# Patient Record
Sex: FEMALE | Race: BLACK OR AFRICAN AMERICAN | NOT HISPANIC OR LATINO | Employment: OTHER | URBAN - METROPOLITAN AREA
[De-identification: names, ages, dates, MRNs, and addresses within clinical notes are randomized per-mention and may not be internally consistent; named-entity substitution may affect disease eponyms.]

---

## 2017-01-20 ENCOUNTER — TRANSCRIBE ORDERS (OUTPATIENT)
Dept: ADMINISTRATIVE | Facility: HOSPITAL | Age: 57
End: 2017-01-20

## 2017-01-20 DIAGNOSIS — Z12.31 VISIT FOR SCREENING MAMMOGRAM: Primary | ICD-10-CM

## 2017-02-27 ENCOUNTER — HOSPITAL ENCOUNTER (OUTPATIENT)
Dept: MAMMOGRAPHY | Facility: HOSPITAL | Age: 57
Discharge: HOME | End: 2017-02-27
Admitting: FAMILY MEDICINE

## 2017-02-27 DIAGNOSIS — Z12.31 VISIT FOR SCREENING MAMMOGRAM: ICD-10-CM

## 2017-02-27 PROCEDURE — 77063 BREAST TOMOSYNTHESIS BI: CPT | Performed by: RADIOLOGY

## 2017-02-27 PROCEDURE — G0202 SCR MAMMO BI INCL CAD: HCPCS

## 2017-02-27 PROCEDURE — 77067 SCR MAMMO BI INCL CAD: CPT | Performed by: RADIOLOGY

## 2017-02-27 PROCEDURE — 77063 BREAST TOMOSYNTHESIS BI: CPT

## 2018-02-13 ENCOUNTER — TRANSCRIBE ORDERS (OUTPATIENT)
Dept: ADMINISTRATIVE | Facility: HOSPITAL | Age: 58
End: 2018-02-13

## 2018-02-13 DIAGNOSIS — Z12.31 VISIT FOR SCREENING MAMMOGRAM: Primary | ICD-10-CM

## 2018-03-01 ENCOUNTER — HOSPITAL ENCOUNTER (OUTPATIENT)
Dept: MAMMOGRAPHY | Facility: HOSPITAL | Age: 58
Discharge: HOME | End: 2018-03-01
Admitting: FAMILY MEDICINE

## 2018-03-01 DIAGNOSIS — Z12.31 VISIT FOR SCREENING MAMMOGRAM: ICD-10-CM

## 2018-03-01 PROCEDURE — 77063 BREAST TOMOSYNTHESIS BI: CPT

## 2018-03-01 PROCEDURE — 77067 SCR MAMMO BI INCL CAD: CPT | Performed by: RADIOLOGY

## 2018-03-01 PROCEDURE — 77063 BREAST TOMOSYNTHESIS BI: CPT | Performed by: RADIOLOGY

## 2018-03-01 PROCEDURE — 77067 SCR MAMMO BI INCL CAD: CPT

## 2019-01-23 ENCOUNTER — TRANSCRIBE ORDERS (OUTPATIENT)
Dept: ADMINISTRATIVE | Facility: HOSPITAL | Age: 59
End: 2019-01-23

## 2019-01-23 DIAGNOSIS — Z12.31 VISIT FOR SCREENING MAMMOGRAM: Primary | ICD-10-CM

## 2019-03-04 ENCOUNTER — HOSPITAL ENCOUNTER (OUTPATIENT)
Dept: MAMMOGRAPHY | Facility: HOSPITAL | Age: 59
Discharge: HOME | End: 2019-03-04
Admitting: FAMILY MEDICINE

## 2019-03-04 DIAGNOSIS — Z12.31 VISIT FOR SCREENING MAMMOGRAM: ICD-10-CM

## 2019-03-04 PROCEDURE — 77063 BREAST TOMOSYNTHESIS BI: CPT | Performed by: RADIOLOGY

## 2019-03-04 PROCEDURE — 77063 BREAST TOMOSYNTHESIS BI: CPT

## 2019-03-04 PROCEDURE — 77067 SCR MAMMO BI INCL CAD: CPT | Performed by: RADIOLOGY

## 2019-03-04 PROCEDURE — 77067 SCR MAMMO BI INCL CAD: CPT

## 2020-02-13 ENCOUNTER — TRANSCRIBE ORDERS (OUTPATIENT)
Dept: ADMINISTRATIVE | Facility: HOSPITAL | Age: 60
End: 2020-02-13

## 2020-02-13 DIAGNOSIS — Z12.31 VISIT FOR SCREENING MAMMOGRAM: Primary | ICD-10-CM

## 2020-04-09 ENCOUNTER — APPOINTMENT (OUTPATIENT)
Dept: MAMMOGRAPHY | Facility: HOSPITAL | Age: 60
End: 2020-04-09

## 2020-05-28 ENCOUNTER — HOSPITAL ENCOUNTER (OUTPATIENT)
Dept: MAMMOGRAPHY | Facility: HOSPITAL | Age: 60
Discharge: HOME | End: 2020-05-28
Admitting: FAMILY MEDICINE

## 2020-05-28 DIAGNOSIS — Z12.31 VISIT FOR SCREENING MAMMOGRAM: ICD-10-CM

## 2020-05-28 PROCEDURE — 77067 SCR MAMMO BI INCL CAD: CPT

## 2020-05-28 PROCEDURE — 77067 SCR MAMMO BI INCL CAD: CPT | Performed by: RADIOLOGY

## 2020-05-28 PROCEDURE — 77063 BREAST TOMOSYNTHESIS BI: CPT

## 2020-05-28 PROCEDURE — 77063 BREAST TOMOSYNTHESIS BI: CPT | Performed by: RADIOLOGY

## 2020-08-11 ENCOUNTER — LAB REQUISITION (OUTPATIENT)
Dept: LAB | Facility: HOSPITAL | Age: 60
End: 2020-08-11

## 2020-08-11 DIAGNOSIS — Z00.00 ROUTINE GENERAL MEDICAL EXAMINATION AT A HEALTH CARE FACILITY: ICD-10-CM

## 2020-08-11 PROCEDURE — 36415 COLL VENOUS BLD VENIPUNCTURE: CPT | Performed by: INTERNAL MEDICINE

## 2020-09-03 ENCOUNTER — OFFICE VISIT (OUTPATIENT)
Dept: OBSTETRICS AND GYNECOLOGY | Facility: CLINIC | Age: 60
End: 2020-09-03

## 2020-09-03 VITALS
BODY MASS INDEX: 25.88 KG/M2 | WEIGHT: 161 LBS | SYSTOLIC BLOOD PRESSURE: 130 MMHG | HEIGHT: 66 IN | DIASTOLIC BLOOD PRESSURE: 70 MMHG

## 2020-09-03 DIAGNOSIS — Z13.820 OSTEOPOROSIS SCREENING: ICD-10-CM

## 2020-09-03 DIAGNOSIS — A63.0 CONDYLOMA ACUMINATUM IN FEMALE: ICD-10-CM

## 2020-09-03 DIAGNOSIS — Z01.419 WOMEN'S ANNUAL ROUTINE GYNECOLOGICAL EXAMINATION: Primary | ICD-10-CM

## 2020-09-03 PROCEDURE — 99396 PREV VISIT EST AGE 40-64: CPT | Performed by: OBSTETRICS & GYNECOLOGY

## 2020-09-03 PROCEDURE — 56501 DESTROY VULVA LESIONS SIM: CPT | Performed by: OBSTETRICS & GYNECOLOGY

## 2020-09-03 NOTE — PATIENT INSTRUCTIONS
Vitamin D Deficiency  Vitamin D deficiency is when your body does not have enough vitamin D. Vitamin D is important to your body because:  · It helps your body use other minerals.  · It helps to keep your bones strong and healthy.  · It may help to prevent some diseases.  · It helps your heart and other muscles work well.  Not getting enough vitamin D can make your bones soft. It can also cause other health problems.  What are the causes?  This condition may be caused by:  · Not eating enough foods that contain vitamin D.  · Not getting enough sun.  · Having diseases that make it hard for your body to absorb vitamin D.  · Having a surgery in which a part of the stomach or a part of the small intestine is removed.  · Having kidney disease or liver disease.  What increases the risk?  You are more likely to get this condition if:  · You are older.  · You do not spend much time outdoors.  · You live in a nursing home.  · You have had broken bones.  · You have weak or thin bones (osteoporosis).  · You have a disease or condition that changes how your body absorbs vitamin D.  · You have dark skin.  · You take certain medicines.  · You are overweight or obese.  What are the signs or symptoms?  · In mild cases, there may not be any symptoms. If the condition is very bad, symptoms may include:  ? Bone pain.  ? Muscle pain.  ? Falling often.  ? Broken bones caused by a minor injury.  How is this treated?  Treatment may include taking supplements as told by your doctor. Your doctor will tell you what dose is best for you. Supplements may include:  · Vitamin D.  · Calcium.  Follow these instructions at home:  Eating and drinking    · Eat foods that contain vitamin D, such as:  ? Dairy products, cereals, or juices with added vitamin D. Check the label.  ? Fish, such as salmon or trout.  ? Eggs.  ? Oysters.  ? Mushrooms.  The items listed above may not be a complete list of what you can eat and drink. Contact a dietitian for more  options.  General instructions  · Take medicines and supplements only as told by your doctor.  · Get regular, safe exposure to natural sunlight.  · Do not use a tanning bed.  · Maintain a healthy weight. Lose weight if needed.  · Keep all follow-up visits as told by your doctor. This is important.  How is this prevented?  · You can get vitamin D by:  ? Eating foods that naturally contain vitamin D.  ? Eating or drinking products that have vitamin D added to them, such as cereals, juices, and milk.  ? Taking vitamin D or a multivitamin that contains vitamin D.  ? Being in the sun. Your body makes vitamin D when your skin is exposed to sunlight. Your body changes the sunlight into a form of the vitamin that it can use.  Contact a doctor if:  · Your symptoms do not go away.  · You feel sick to your stomach (nauseous).  · You throw up (vomit).  · You poop less often than normal, or you have trouble pooping (constipation).  Summary  · Vitamin D deficiency is when your body does not have enough vitamin D.  · Vitamin D helps to keep your bones strong and healthy.  · This condition is often treated by taking a supplement.  · Your doctor will tell you what dose is best for you.  This information is not intended to replace advice given to you by your health care provider. Make sure you discuss any questions you have with your health care provider.  Document Released: 12/06/2012 Document Revised: 08/26/2019 Document Reviewed: 08/26/2019  [employer] Patient Education © 2020 [employer]  Breast Self-Awareness  Breast self-awareness is knowing how your breasts look and feel. Doing breast self-awareness is important. It allows you to catch a breast problem early while it is still small and can be treated. All women should do breast self-awareness, including women who have had breast implants. Tell your doctor if you notice a change in your breasts.  What you need:  · A mirror.  · A well-lit room.  How to do a breast self-exam  A  breast self-exam is one way to learn what is normal for your breasts and to check for changes. To do a breast self-exam:  Look for changes    1. Take off all the clothes above your waist.  2.  front of a mirror in a room with good lighting.  3. Put your hands on your hips.  4. Push your hands down.  5. Look at your breasts and nipples in the mirror to see if one breast or nipple looks different from the other. Check to see if:  ? The shape of one breast is different.  ? The size of one breast is different.  ? There are wrinkles, dips, and bumps in one breast and not the other.  6. Look at each breast for changes in the skin, such as:  ? Redness.  ? Scaly areas.  7. Look for changes in your nipples, such as:  ? Liquid around the nipples.  ? Bleeding.  ? Dimpling.  ? Redness.  ? A change in where the nipples are.  Feel for changes    1. Lie on your back on the floor.  2. Feel each breast. To do this, follow these steps:  ? Pick a breast to feel.  ? Put the arm closest to that breast above your head.  ? Use your other arm to feel the nipple area of your breast. Feel the area with the pads of your three middle fingers by making small circles with your fingers. For the first [place], press lightly. For the second [place], press harder. For the third [place], press even harder.  ? Keep making circles with your fingers at the different pressures as you move down your breast. Stop when you feel your ribs.  ? Move your fingers a little toward the center of your body.  ? Start making circles with your fingers again, this time going up until you reach your collarbone.  ? Keep making up-and-down circles until you reach your armpit. Remember to keep using the three pressures.  ? Feel the other breast in the same way.  3. Sit or  the tub or shower.  4. With soapy water on your skin, feel each breast the same way you did in step 2 when you were lying on the floor.  Write down what you find  Writing down what you find  can help you remember what to tell your doctor. Write down:  · What is normal for each breast.  · Any changes you find in each breast, including:  ? The kind of changes you find.  ? Whether you have pain.  ? Size and location of any lumps.  · When you last had your menstrual period.  General tips  · Check your breasts every month.  · If you are breastfeeding, the best time to check your breasts is after you feed your baby or after you use a breast pump.  · If you get menstrual periods, the best time to check your breasts is 5-7 days after your menstrual period is over.  · With time, you will become comfortable with the self-exam, and you will begin to know if there are changes in your breasts.  Contact a doctor if you:  · See a change in the shape or size of your breasts or nipples.  · See a change in the skin of your breast or nipples, such as red or scaly skin.  · Have fluid coming from your nipples that is not normal.  · Find a lump or thick area that was not there before.  · Have pain in your breasts.  · Have any concerns about your breast health.  Summary  · Breast self-awareness includes looking for changes in your breasts, as well as feeling for changes within your breasts.  · Breast self-awareness should be done in front of a mirror in a well-lit room.  · You should check your breasts every month. If you get menstrual periods, the best time to check your breasts is 5-7 days after your menstrual period is over.  · Let your doctor know of any changes you see in your breasts, including changes in size, changes on the skin, pain or tenderness, or fluid from your nipples that is not normal.  This information is not intended to replace advice given to you by your health care provider. Make sure you discuss any questions you have with your health care provider.  Document Released: 06/05/2009 Document Revised: 08/06/2019 Document Reviewed: 08/06/2019  [employer] Patient Education © 2020 [employer]

## 2020-09-03 NOTE — PROGRESS NOTES
GYN Annual Exam     CC - Here for annual exam.     Subjective   HPI  Jessika Mustafa is a 60 y.o. female, No obstetric history on file., who presents for annual well woman exam.  She is postmenopausal.  Patient reports problems with: none.  Partner Status: Marital Status: single.  New Partners since last visit: no.      Additional OB/GYN History   Current contraception: contraceptive methods: Tubal ligation  Desires to: no contraception  Last Pap : 2019 - results were Negative, HPV non 16/18 positive  Last Completed Pap Smear       Status Date      PAP SMEAR No completions recorded        History of abnormal Pap smear: yes - HPV non 16/18 positive  Family history of uterine, colon, breast, or ovarian cancer: no  Performs monthly Self-Breast Exam: no  Last mammogram:   Last Completed Mammogram       Status Date      MAMMOGRAM Done 2020 MAMMO SCREENING DIGITAL TOMOSYNTHESIS BILATERAL W CAD     Patient has more history with this topic...        Last colonoscopy:  at [facility] pt reports that the results showed 2 polyps, which were removed and sent for testing.  They were benign.  She is due for a follow-up in five years.  Last Completed Colonoscopy       Status Date      COLONOSCOPY No completions recorded        Last DEXA:  - pt reports that osteopenia in spine and hips  Exercises Regularly: no  Feelings of Anxiety or Depression: no    Tobacco Usage?: No   OB History             Para        Term   0            AB        Living           SAB        TAB        Ectopic        Molar        Multiple        Live Births                    Health Maintenance   Topic Date Due   • ANNUAL PHYSICAL  1963   • PNEUMOCOCCAL VACCINE (19-64 MEDIUM RISK) (1 of 1 - PPSV23) 1981   • ZOSTER VACCINE (1 of 2) 2012   • INFLUENZA VACCINE  2020   • HEPATITIS C SCREENING  2020   • PAP SMEAR  2020   • COLONOSCOPY  2020   • Annual Gynecologic Pelvic and Breast Exam   "09/04/2021   • MAMMOGRAM  05/28/2022   • TDAP/TD VACCINES (2 - Td) 05/25/2025       The additional following portions of the patient's history were reviewed and updated as appropriate: allergies, current medications, past family history, past medical history, past social history, past surgical history and problem list.    Review of Systems   Constitutional: Negative.    HENT: Negative.    Eyes: Negative.    Respiratory: Negative.    Cardiovascular: Negative.    Gastrointestinal: Negative.    Genitourinary: Negative.    Skin: Negative.    Psychiatric/Behavioral: Negative.        I have reviewed and agree with the HPI, ROS, and historical information as entered above. Brook Braga MD    Objective   /70   Ht 166.4 cm (65.5\")   Wt 73 kg (161 lb)   LMP 12/01/2010   BMI 26.38 kg/m²     Physical Exam   Constitutional: She is oriented to person, place, and time. She appears well-developed and well-nourished.   HENT:   Head: Normocephalic and atraumatic.   Cardiovascular: Normal rate and regular rhythm.   No murmur heard.  Pulmonary/Chest: Effort normal and breath sounds normal. She has no wheezes. She has no rhonchi. She has no rales. She exhibits no mass, no tenderness and no retraction. Right breast exhibits no mass, no nipple discharge, no skin change and no tenderness. Left breast exhibits no mass, no nipple discharge, no skin change and no tenderness.   Abdominal: Soft. Bowel sounds are normal. She exhibits no mass. There is no tenderness. There is no rigidity and no guarding. No hernia. Hernia confirmed negative in the right inguinal area and confirmed negative in the left inguinal area.   Genitourinary: Vagina normal, uterus normal and cervix normal. Rectal exam shows no external hemorrhoid.       There is lesion on the right labia. There is no rash or tenderness on the right labia. There is no rash, tenderness or lesion on the left labia. Right adnexum displays no mass and no tenderness. Left adnexum " displays no mass and no tenderness. Vagina exhibits no lesion. No vaginal discharge found.   Genitourinary Comments: Chaperone Present   Lymphadenopathy:        Right: No inguinal adenopathy present.        Left: No inguinal adenopathy present.   Neurological: She is alert and oriented to person, place, and time.   Psychiatric: She has a normal mood and affect. Her behavior is normal.   Nursing note and vitals reviewed.      Procedure: Condyloma Treatment    Procedures    Risks and benefits discussed?: YES  All questions answered?: YES  Consents given by: The Patient  Written consent obtained?: Verbal consent was obtained    ANESTHESIA:  None    Indications:   Jessika Mustafa is a 60 y.o. female, No obstetric history on file., who presents today with lesions located on right labia.  They have been present for 1 year. The patient understands all risks, benefits, indications, potential complications, and alternatives, and freely consents for the procedure.  The patient also understands the option of not performing the surgery, the risk for scarring, and the technique of the procedure.    Skin: 4 warts noted on right vulva. Size range is 1-4 mm.    Procedure documentation:  After informed consent was obtained,  the patient was placed in the dorsal lithotomy position and examined.  TCA was carefully applied to the condyloma, taking care to avoid surrounding skin.  This was done till the condyloma whitened thoroughly.  Antibiotic ointment was applied to surrounding skin as a protectant.  Jessika tolerated the procedure well and without complications.  The patient will be alert for any signs of irritation and will follow up as instructed.  She tolerated the procedure well.  There were no complications.    Discussed follow up care and planned to return to clinic as directed.    Brook Braga MD  09/03/2020        Assessment/Plan     Assessment     Problem List Items Addressed This Visit     None      Visit Diagnoses      Women's annual routine gynecological examination    -  Primary    Relevant Orders    Pap IG, HPV-hr    Mammo Screening Digital Tomosynthesis Bilateral With CAD    Osteoporosis screening        Relevant Orders    DEXA Bone Density Axial    Condyloma acuminatum in female              1. GYN annual well woman exam.   2. Condyloma Accuminatum  3. Screening for Osteoporsis  4. HPV +      Plan     1. Reviewed monthly self breast exams.  Instructed to call with lumps, pain, or breast discharge.  Yearly mammograms ordered.  2. Ordered mammogram today.  3. Recommended use of Vitamin D and getting adequate calcium in her diet. (1500mg)  4. Osteoporosis screening ordered today.  5. RTC in 1 year or PRN with problems.  6. Call or RTC if any further issues with Condyloma   7. Normal pap with HPV + in 2019---Repeat pap with HPV testing today    Brook Braga MD  09/03/2020

## 2020-09-11 ENCOUNTER — TELEPHONE (OUTPATIENT)
Dept: OBSTETRICS AND GYNECOLOGY | Facility: CLINIC | Age: 60
End: 2020-09-11

## 2020-09-11 DIAGNOSIS — Z13.820 OSTEOPOROSIS SCREENING: Primary | ICD-10-CM

## 2020-09-11 NOTE — TELEPHONE ENCOUNTER
Spoke w/pt about her bone density scan results and recommendations from provider.  Pt states she was not sure if she had her Vitamin D level checked lately.  I will add an order for those labs.  She is going to stop by and get them drawn.

## 2020-09-11 NOTE — TELEPHONE ENCOUNTER
----- Message from Brook Braga MD sent at 9/3/2020  5:20 PM EDT -----  Can you please call her and let her know her BMD has worsened.   She now has moderate osteopenia.  Has she had vit D level checked?  We should probably check labs if she hasnt had those checked elsewhere.  Also make sure she is taking calcium, vit d and doing weight bearing exercises like walking, lifting weights.  She definitely needs to repeat this in 2 years.  IF she isnt doing any of those things we could start with this.  If she is doing all these things, we may need to consider starting meds.

## 2020-09-15 ENCOUNTER — LAB (OUTPATIENT)
Dept: OBSTETRICS AND GYNECOLOGY | Facility: CLINIC | Age: 60
End: 2020-09-15

## 2020-09-16 ENCOUNTER — RESULTS ENCOUNTER (OUTPATIENT)
Dept: OBSTETRICS AND GYNECOLOGY | Facility: CLINIC | Age: 60
End: 2020-09-16

## 2020-09-16 DIAGNOSIS — Z13.820 OSTEOPOROSIS SCREENING: ICD-10-CM

## 2020-09-16 LAB — 25(OH)D3+25(OH)D2 SERPL-MCNC: 35.8 NG/ML (ref 30–100)

## 2020-09-29 ENCOUNTER — TELEPHONE (OUTPATIENT)
Dept: OBSTETRICS AND GYNECOLOGY | Facility: CLINIC | Age: 60
End: 2020-09-29

## 2020-09-29 NOTE — TELEPHONE ENCOUNTER
Pap smear collected on 9/3/2020 resulted negative, HPV non16/18+. Per Dr. Braga, a colposcopy is warranted. Called patient and informed her of results/recommendations. Patient scheduled for colposcopy on 10/15/2020 at 0840.

## 2020-09-30 DIAGNOSIS — Z12.31 BREAST CANCER SCREENING BY MAMMOGRAM: Primary | ICD-10-CM

## 2020-10-15 ENCOUNTER — OFFICE VISIT (OUTPATIENT)
Dept: OBSTETRICS AND GYNECOLOGY | Facility: CLINIC | Age: 60
End: 2020-10-15

## 2020-10-15 VITALS
HEIGHT: 66 IN | WEIGHT: 161 LBS | DIASTOLIC BLOOD PRESSURE: 78 MMHG | BODY MASS INDEX: 25.88 KG/M2 | SYSTOLIC BLOOD PRESSURE: 158 MMHG

## 2020-10-15 DIAGNOSIS — R87.810 PAP SMEAR OF CERVIX SHOWS HIGH RISK HPV PRESENT: Primary | ICD-10-CM

## 2020-10-15 DIAGNOSIS — Z72.0 TOBACCO ABUSE: ICD-10-CM

## 2020-10-15 PROCEDURE — 57454 BX/CURETT OF CERVIX W/SCOPE: CPT | Performed by: OBSTETRICS & GYNECOLOGY

## 2020-10-15 RX ORDER — FOLIC ACID 1 MG/1
TABLET ORAL
COMMUNITY
Start: 2020-09-04 | End: 2022-09-15

## 2020-10-15 RX ORDER — CITALOPRAM 20 MG/1
TABLET ORAL
COMMUNITY
Start: 2020-09-21

## 2020-10-15 RX ORDER — METHOTREXATE 15 MG/.3ML
INJECTION, SOLUTION SUBCUTANEOUS
COMMUNITY
Start: 2020-10-06 | End: 2022-09-15

## 2020-10-15 RX ORDER — SUMATRIPTAN 100 MG/1
TABLET, FILM COATED ORAL
COMMUNITY
Start: 2020-08-18

## 2020-10-15 RX ORDER — OLMESARTAN MEDOXOMIL AND HYDROCHLOROTHIAZIDE 40/12.5 40; 12.5 MG/1; MG/1
TABLET ORAL
COMMUNITY
Start: 2020-09-08

## 2020-10-15 RX ORDER — ABATACEPT 125 MG/ML
INJECTION, SOLUTION SUBCUTANEOUS
COMMUNITY
Start: 2020-10-06 | End: 2022-09-15

## 2020-10-15 RX ORDER — TRIAMCINOLONE ACETONIDE 1 MG/G
CREAM TOPICAL
COMMUNITY
Start: 2020-10-13 | End: 2022-09-15

## 2020-10-15 RX ORDER — VARENICLINE TARTRATE 0.5 MG/1
0.5 TABLET, FILM COATED ORAL 2 TIMES DAILY
Qty: 30 TABLET | Refills: 2 | Status: SHIPPED | OUTPATIENT
Start: 2020-10-15 | End: 2022-09-15

## 2020-10-15 RX ORDER — POTASSIUM CHLORIDE 1500 MG/1
TABLET, EXTENDED RELEASE ORAL
COMMUNITY
Start: 2020-10-12

## 2020-10-15 RX ORDER — LATANOPROST 50 UG/ML
SOLUTION/ DROPS OPHTHALMIC
COMMUNITY
Start: 2020-09-22

## 2020-10-15 NOTE — PROGRESS NOTES
Colposcopy Procedure Note  Procedures    Indications: Jessika Mustafa is a 60 y.o. female, , who presents today for colposcopy for evaluation of a pap smear revealing: negative non 16/18+.  She understands the need for the procedure and is aware of the complications, including post-colposcopic vaginal bleeding, vaginal leukorrhea or cervicitis.  She is aware she may experience discomfort.  After being presented with the risk, benefits, and alternatives the patient wished to proceed.      Most recent Pap smear showed: negative, non16/18+.      Prior cervical/vaginal disease: HPV related changes.    Prior cervical treatment: no treatment.    Procedure Details   The risks and benefits of the procedure and written informed consent obtained.  She was positioned in the dorsal lithotomy position and a speculum was inserted into the vagina and excellent visualization of cervix achieved, cervix swabbed x 3 with acetic acid solution and Lugol's solution. The transformation zone was not completely visualized.  A cervical biopsy was obtained.  An endocervical curettage was performed.  This colposcopy was unsatisfactory.     Pt with extremely small external os.  This is so small I am unable to get a Kevorkian in to obtain an adequate biopsy.  Biopsy of the 3 o'clock position was taken with a combination of ECC and attempts with the Kevorkian.    Findings:  The procedure was notable for:  Cervix: acetowhite lesion(s) noted at 3 o'clock; cervix swabbed with Lugol's solution and SCJ visualized - lesion at 3 o'clock.  Vaginal inspection: normal without visible lesions.    Physical Exam  Vitals signs and nursing note reviewed. Exam conducted with a chaperone present.   Constitutional:       Appearance: Normal appearance. She is well-developed.   HENT:      Head: Normocephalic and atraumatic.   Pulmonary:      Effort: Pulmonary effort is normal.   Abdominal:      Palpations: Abdomen is not rigid.   Genitourinary:     Exam  position: Lithotomy position.      Comments: Cervix: visible lesion(s) at 3 o'clock  Skin:     General: Skin is warm and dry.   Neurological:      Mental Status: She is alert and oriented to person, place, and time.   Psychiatric:         Mood and Affect: Mood normal.         Behavior: Behavior normal.         Specimens: 3:00 and ECC    Complications: none.    Patient tolerated the procedure well and with minimal discomfort.     Problems Addressed this Visit     None      Visit Diagnoses     Pap smear of cervix shows high risk HPV present    -  Primary    Relevant Orders    Colposcopy    Tobacco abuse          Diagnoses       Codes Comments    Pap smear of cervix shows high risk HPV present    -  Primary ICD-10-CM: R87.810  ICD-9-CM: 795.05     Tobacco abuse     ICD-10-CM: Z72.0  ICD-9-CM: 305.1             Specimens labelled and sent to Pathology.  Will base further treatment on Pathology findings.  Post biopsy instructions given to patient.  Patient does smoke.  We discussed the importance of tobacco cessation regarding HPV and progression to cancer.  We also reviewed tobacco and other medical complications.  She does desire to quit.  She expresses that she would like a trial of Chantix.  She will begin this medication today.  She was counseled on this for greater than 3 minutes.    Brook Braga MD  10/15/2020

## 2020-10-15 NOTE — PATIENT INSTRUCTIONS
Varenicline oral tablets  What is this medicine?  VARENICLINE (dakota e NI kleen) is used to help people quit smoking. It is used with a patient support program recommended by your physician.  This medicine may be used for other purposes; ask your health care provider or pharmacist if you have questions.  COMMON BRAND NAME(S): Deyanira  What should I tell my health care provider before I take this medicine?  They need to know if you have any of these conditions:  · heart disease  · if you often drink alcohol  · kidney disease  · mental illness  · on hemodialysis  · seizures  · history of stroke  · suicidal thoughts, plans, or attempt; a previous suicide attempt by you or a family member  · an unusual or allergic reaction to varenicline, other medicines, foods, dyes, or preservatives  · pregnant or trying to get pregnant  · breast-feeding  How should I use this medicine?  Take this medicine by mouth after eating. Take with a full glass of water. Follow the directions on the prescription label. Take your doses at regular intervals. Do not take your medicine more often than directed.  There are 3 ways you can use this medicine to help you quit smoking; talk to your health care professional to decide which plan is right for you:  1) you can choose a quit date and start this medicine 1 week before the quit date, or,  2) you can start taking this medicine before you choose a quit date, and then pick a quit date between day 8 and 35 days of treatment, or,  3) if you are not sure that you are able or willing to quit smoking right away, start taking this medicine and slowly decrease the amount you smoke as directed by your health care professional with the goal of being cigarette-free by week 12 of treatment.  Stick to your plan; ask about support groups or other ways to help you remain cigarette-free. If you are motivated to quit smoking and did not succeed during a previous attempt with this medicine for reasons other than  side effects, or if you returned to smoking after this treatment, speak with your health care professional about whether another course of this medicine may be right for you.  A special MedGuide will be given to you by the pharmacist with each prescription and refill. Be sure to read this information carefully each time.  Talk to your pediatrician regarding the use of this medicine in children. This medicine is not approved for use in children.  Overdosage: If you think you have taken too much of this medicine contact a poison control center or emergency room at once.  NOTE: This medicine is only for you. Do not share this medicine with others.  What if I miss a dose?  If you miss a dose, take it as soon as you can. If it is almost time for your next dose, take only that dose. Do not take double or extra doses.  What may interact with this medicine?  · alcohol  · insulin  · other medicines used to help people quit smoking  · theophylline  · warfarin  This list may not describe all possible interactions. Give your health care provider a list of all the medicines, herbs, non-prescription drugs, or dietary supplements you use. Also tell them if you smoke, drink alcohol, or use illegal drugs. Some items may interact with your medicine.  What should I watch for while using this medicine?  It is okay if you do not succeed at your attempt to quit and have a cigarette. You can still continue your quit attempt and keep using this medicine as directed. Just throw away your cigarettes and get back to your quit plan.  Talk to your health care provider before using other treatments to quit smoking. Using this medicine with other treatments to quit smoking may increase the risk for side effects compared to using a treatment alone.  You may get drowsy or dizzy. Do not drive, use machinery, or do anything that needs mental alertness until you know how this medicine affects you. Do not stand or sit up quickly, especially if you are  "an older patient. This reduces the risk of dizzy or fainting spells.  Decrease the number of alcoholic beverages that you drink during treatment with this medicine until you know if this medicine affects your ability to tolerate alcohol. Some people have experienced increased drunkenness (intoxication), unusual or sometimes aggressive behavior, or no memory of things that have happened (amnesia) during treatment with this medicine.  Sleepwalking can happen during treatment with this medicine, and can sometimes lead to behavior that is harmful to you, other people, or property. Stop taking this medicine and tell your doctor if you start sleepwalking or have other unusual sleep-related activity.  After taking this medicine, you may get up out of bed and do an activity that you do not know you are doing. The next morning, you may have no memory of this. Activities include driving a car (\"sleep-driving\"), making and eating food, talking on the phone, sexual activity, and sleep-walking. Serious injuries have occurred. Stop the medicine and call your doctor right away if you find out you have done any of these activities. Do not take this medicine if you have used alcohol that evening. Do not take it if you have taken another medicine for sleep. The risk of doing these sleep-related activities is higher.  Patients and their families should watch out for new or worsening depression or thoughts of suicide. Also watch out for sudden changes in feelings such as feeling anxious, agitated, panicky, irritable, hostile, aggressive, impulsive, severely restless, overly excited and hyperactive, or not being able to sleep. If this happens, call your health care professional.  If you have diabetes and you quit smoking, the effects of insulin may be increased and you may need to reduce your insulin dose. Check with your doctor or health care professional about how you should adjust your insulin dose.  What side effects may I notice " from receiving this medicine?  Side effects that you should report to your doctor or health care professional as soon as possible:  · allergic reactions like skin rash, itching or hives, swelling of the face, lips, tongue, or throat  · acting aggressive, being angry or violent, or acting on dangerous impulses  · breathing problems  · changes in emotions or moods  · chest pain or chest tightness  · feeling faint or lightheaded, falls  · hallucination, loss of contact with reality  · mouth sores  · redness, blistering, peeling or loosening of the skin, including inside the mouth  · signs and symptoms of a stroke like changes in vision; confusion; trouble speaking or understanding; severe headaches; sudden numbness or weakness of the face, arm or leg; trouble walking; dizziness; loss of balance or coordination  · seizures  · sleepwalking  · suicidal thoughts or other mood changes  Side effects that usually do not require medical attention (report to your doctor or health care professional if they continue or are bothersome):  · constipation  · gas  · headache  · nausea, vomiting  · strange dreams  · trouble sleeping  This list may not describe all possible side effects. Call your doctor for medical advice about side effects. You may report side effects to FDA at 2-445-FDA-4794.  Where should I keep my medicine?  Keep out of the reach of children.  Store at room temperature between 15 and 30 degrees C (59 and 86 degrees F). Throw away any unused medicine after the expiration date.  NOTE: This sheet is a summary. It may not cover all possible information. If you have questions about this medicine, talk to your doctor, pharmacist, or health care provider.  © 2020 Elsevier/Gold Standard (2019-12-06 14:27:36)

## 2020-10-20 NOTE — PROGRESS NOTES
Please call patient about results.  No dysplasia or cancer seen.  Repeat pap in 1 year with her annual.

## 2020-10-22 ENCOUNTER — TELEPHONE (OUTPATIENT)
Dept: OBSTETRICS AND GYNECOLOGY | Facility: CLINIC | Age: 60
End: 2020-10-22

## 2020-10-22 NOTE — TELEPHONE ENCOUNTER
Called patient to inform her of negative colposcopy results, performed on 10/15/2020. Patient verified understanding. Follow up pap smear with her annual gynecological exam next year.

## 2021-06-01 ENCOUNTER — HOSPITAL ENCOUNTER (OUTPATIENT)
Dept: MAMMOGRAPHY | Facility: HOSPITAL | Age: 61
Discharge: HOME | End: 2021-06-01
Admitting: OBSTETRICS & GYNECOLOGY

## 2021-06-01 DIAGNOSIS — Z01.419 WOMEN'S ANNUAL ROUTINE GYNECOLOGICAL EXAMINATION: ICD-10-CM

## 2021-06-01 PROCEDURE — 77063 BREAST TOMOSYNTHESIS BI: CPT

## 2021-06-01 PROCEDURE — 77067 SCR MAMMO BI INCL CAD: CPT | Performed by: RADIOLOGY

## 2021-06-01 PROCEDURE — 77063 BREAST TOMOSYNTHESIS BI: CPT | Performed by: RADIOLOGY

## 2021-06-01 PROCEDURE — 77067 SCR MAMMO BI INCL CAD: CPT

## 2021-06-11 ENCOUNTER — HOSPITAL ENCOUNTER (OUTPATIENT)
Dept: MAMMOGRAPHY | Facility: HOSPITAL | Age: 61
Discharge: HOME | End: 2021-06-11

## 2021-06-11 DIAGNOSIS — Z12.31 VISIT FOR SCREENING MAMMOGRAM: ICD-10-CM

## 2021-09-09 ENCOUNTER — OFFICE VISIT (OUTPATIENT)
Dept: OBSTETRICS AND GYNECOLOGY | Facility: CLINIC | Age: 61
End: 2021-09-09

## 2021-09-09 DIAGNOSIS — Z01.419 PAP TEST, AS PART OF ROUTINE GYNECOLOGICAL EXAMINATION: Primary | ICD-10-CM

## 2021-09-09 DIAGNOSIS — B97.7 HPV (HUMAN PAPILLOMA VIRUS) INFECTION: ICD-10-CM

## 2021-09-09 DIAGNOSIS — Z12.31 BREAST CANCER SCREENING BY MAMMOGRAM: ICD-10-CM

## 2021-09-09 DIAGNOSIS — Z01.419 ENCOUNTER FOR GYNECOLOGICAL EXAMINATION WITHOUT ABNORMAL FINDING: ICD-10-CM

## 2021-09-09 PROCEDURE — 99396 PREV VISIT EST AGE 40-64: CPT | Performed by: OBSTETRICS & GYNECOLOGY

## 2021-09-09 RX ORDER — TOFACITINIB 11 MG/1
TABLET, FILM COATED, EXTENDED RELEASE ORAL
COMMUNITY
Start: 2021-08-24

## 2021-09-09 NOTE — PATIENT INSTRUCTIONS
Breast Self-Awareness  Breast self-awareness is knowing how your breasts look and feel. Doing breast self-awareness is important. It allows you to catch a breast problem early while it is still small and can be treated. All women should do breast self-awareness, including women who have had breast implants. Tell your doctor if you notice a change in your breasts.  What you need:  · A mirror.  · A well-lit room.  How to do a breast self-exam  A breast self-exam is one way to learn what is normal for your breasts and to check for changes. To do a breast self-exam:  Look for changes    1. Take off all the clothes above your waist.  2.  front of a mirror in a room with good lighting.  3. Put your hands on your hips.  4. Push your hands down.  5. Look at your breasts and nipples in the mirror to see if one breast or nipple looks different from the other. Check to see if:  ? The shape of one breast is different.  ? The size of one breast is different.  ? There are wrinkles, dips, and bumps in one breast and not the other.  6. Look at each breast for changes in the skin, such as:  ? Redness.  ? Scaly areas.  7. Look for changes in your nipples, such as:  ? Liquid around the nipples.  ? Bleeding.  ? Dimpling.  ? Redness.  ? A change in where the nipples are.    Feel for changes    1. Lie on your back on the floor.  2. Feel each breast. To do this, follow these steps:  ? Pick a breast to feel.  ? Put the arm closest to that breast above your head.  ? Use your other arm to feel the nipple area of your breast. Feel the area with the pads of your three middle fingers by making small circles with your fingers. For the first [place], press lightly. For the second [place], press harder. For the third [place], press even harder.  ? Keep making circles with your fingers at the different pressures as you move down your breast. Stop when you feel your ribs.  ? Move your fingers a little toward the center of your body.  ? Start  making circles with your fingers again, this time going up until you reach your collarbone.  ? Keep making up-and-down circles until you reach your armpit. Remember to keep using the three pressures.  ? Feel the other breast in the same way.  3. Sit or  the tub or shower.  4. With soapy water on your skin, feel each breast the same way you did in step 2 when you were lying on the floor.    Write down what you find  Writing down what you find can help you remember what to tell your doctor. Write down:  · What is normal for each breast.  · Any changes you find in each breast, including:  ? The kind of changes you find.  ? Whether you have pain.  ? Size and location of any lumps.  · When you last had your menstrual period.  General tips  · Check your breasts every month.  · If you are breastfeeding, the best time to check your breasts is after you feed your baby or after you use a breast pump.  · If you get menstrual periods, the best time to check your breasts is 5-7 days after your menstrual period is over.  · With time, you will become comfortable with the self-exam, and you will begin to know if there are changes in your breasts.  Contact a doctor if you:  · See a change in the shape or size of your breasts or nipples.  · See a change in the skin of your breast or nipples, such as red or scaly skin.  · Have fluid coming from your nipples that is not normal.  · Find a lump or thick area that was not there before.  · Have pain in your breasts.  · Have any concerns about your breast health.  Summary  · Breast self-awareness includes looking for changes in your breasts, as well as feeling for changes within your breasts.  · Breast self-awareness should be done in front of a mirror in a well-lit room.  · You should check your breasts every month. If you get menstrual periods, the best time to check your breasts is 5-7 days after your menstrual period is over.  · Let your doctor know of any changes you see in  your breasts, including changes in size, changes on the skin, pain or tenderness, or fluid from your nipples that is not normal.  This information is not intended to replace advice given to you by your health care provider. Make sure you discuss any questions you have with your health care provider.  Document Revised: 08/06/2019 Document Reviewed: 08/06/2019  [employer] Patient Education © 2021 [employer].

## 2021-09-09 NOTE — PROGRESS NOTES
Gynecologic Annual Exam Note        CC - Here for Annual Exam.     Subjective     HPI  Jessika Mustafa is a 61 y.o. female, , who presents for annual well woman exam.  She is postmenopausal.  Patient reports problems with: none.  Partner Status: Marital Status: .  New Partners since last visit: no.   She Denies vasomotor symptoms. Tubal with Dr. Coello. She denies  issues with urinary incontinence.     The patient does not have any complaints today.    She exercises regularly: yes.  She has concerns about domestic violence: no.        Additional OB/GYN History       Last Pap :   Last Completed Pap Smear          Ordered - PAP SMEAR (Every 3 Years) Ordered on 2020  Pap IG, HPV-hr              History of abnormal Pap smear: yes - neg HPV non 16/18 +   Family history of uterine, colon, breast, or ovarian cancer: yes - Paternal Cousin  Performs monthly Self-Breast Exam: yes  Last mammogram: technical recall told was from [facility]   Last Completed Mammogram          Ordered - MAMMOGRAM (Every 2 Years) Ordered on 2021  Mammo Screening Technical Recall Left    2021  Mammo Screening Digital Tomosynthesis Bilateral With CAD    2020  Mammo Screening Digital Tomosynthesis Bilateral With CAD    2019  Mammo Screening Digital Tomosynthesis Bilateral With CAD    2018  Mammo Screening Digital Tomosynthesis Bilateral With CAD    Only the first 5 history entries have been loaded, but more history exists.              Last colonoscopy:  per pt - [facility] - polyps - f/u 5 years  Last Completed Colonoscopy     This patient has no relevant Health Maintenance data.        Last DEXA: 2020 here last year  Exercises Regularly: yes  Feelings of Anxiety or Depression: yes - on Celexa    Tobacco Usage?: Yes Jessika Mustafa  reports that she has been smoking cigarettes. She has a 15.00 pack-year smoking history. She has never used smokeless tobacco.. I  have educated her on the risk of diseases from using tobacco products such as cancer, COPD and heart disease.     I advised her to quit and she is not willing to quit. [facility] recalled     I spent 3  minutes counseling the patient.        OB History        0    Para   0    Term   0       0    AB   0    Living   0       SAB   0    TAB   0    Ectopic   0    Molar   0    Multiple   0    Live Births   0                Health Maintenance   Topic Date Due   • ANNUAL PHYSICAL  Never done   • Pneumococcal Vaccine 0-64 (1 of 2 - PPSV23) Never done   • HEPATITIS C SCREENING  Never done   • Annual Gynecologic Pelvic and Breast Exam  2021   • INFLUENZA VACCINE  10/01/2021   • COLORECTAL CANCER SCREENING  2022   • MAMMOGRAM  2023   • PAP SMEAR  2023   • TDAP/TD VACCINES (2 - Td or Tdap) 2025   • COVID-19 Vaccine  Completed   • ZOSTER VACCINE  Completed       The additional following portions of the patient's history were reviewed and updated as appropriate: current medications, past family history, past medical history, past social history, past surgical history and problem list.    Past Medical History:   Diagnosis Date   • Abnormal Pap smear of cervix     HPV + non 16/18   • Arthritis, rheumatoid (CMS/HCC)    • Hypertension         Past Surgical History:   Procedure Laterality Date   • COLONOSCOPY  2017    benign polyps; repeat in 5 years   • COLPOSCOPY W/ BIOPSY / CURETTAGE  2018, 2019   • KNEE SURGERY Bilateral    • TUBAL ABDOMINAL LIGATION          Review of Systems   Constitutional: Negative.    HENT: Negative.    Eyes: Negative.    Respiratory: Negative.    Cardiovascular: Negative.    Gastrointestinal: Negative.    Endocrine: Negative.    Genitourinary: Negative.    Musculoskeletal: Negative.    Skin: Negative.    Allergic/Immunologic: Negative.    Neurological: Negative.    Hematological: Negative.    Psychiatric/Behavioral: Negative.        I have reviewed  and agree with the HPI, ROS, and historical information as entered above. Brook Braga MD    Objective   LMP 12/01/2010     Physical Exam  Vitals and nursing note reviewed. Exam conducted with a chaperone present.   Constitutional:       General: She is awake.   HENT:      Head: Normocephalic and atraumatic.   Neck:      Thyroid: No thyroid mass, thyromegaly or thyroid tenderness.   Cardiovascular:      Rate and Rhythm: Normal rate.      Heart sounds: No murmur heard.   No friction rub. No gallop.    Pulmonary:      Effort: Pulmonary effort is normal.      Breath sounds: Normal breath sounds. No stridor. No wheezing, rhonchi or rales.   Chest:      Chest wall: No mass or tenderness.      Breasts:         Right: Normal. No bleeding, inverted nipple, mass, nipple discharge, skin change or tenderness.         Left: Normal. No bleeding, inverted nipple, mass, nipple discharge, skin change or tenderness.   Abdominal:      Palpations: Abdomen is soft.      Tenderness: There is no guarding or rebound.      Hernia: There is no hernia in the left inguinal area or right inguinal area.   Genitourinary:     General: Normal vulva.      Pubic Area: No rash.       Labia:         Right: No rash, tenderness, lesion or injury.         Left: No rash, tenderness, lesion or injury.       Urethra: No prolapse, urethral swelling or urethral lesion.      Vagina: No foreign body. No vaginal discharge, erythema, tenderness, bleeding or lesions.      Cervix: No cervical motion tenderness, discharge, friability, lesion, erythema or cervical bleeding.      Uterus: Normal. Not deviated, not enlarged, not fixed and not tender.       Adnexa: Right adnexa normal and left adnexa normal.        Right: No mass, tenderness or fullness.          Left: No mass, tenderness or fullness.        Rectum: No external hemorrhoid.   Musculoskeletal:      Cervical back: Normal range of motion.   Lymphadenopathy:      Upper Body:      Right upper body: No  supraclavicular or axillary adenopathy.      Left upper body: No supraclavicular or axillary adenopathy.   Skin:     General: Skin is warm.   Neurological:      Mental Status: She is alert and oriented to person, place, and time.   Psychiatric:         Mood and Affect: Mood and affect normal.         Speech: Speech normal.         Assessment/Plan     Assessment     Problem List Items Addressed This Visit     None      Visit Diagnoses     Pap test, as part of routine gynecological examination    -  Primary    Relevant Orders    Pap IG, HPV-hr    Breast cancer screening by mammogram        Relevant Orders    Mammo Screening Digital Tomosynthesis Bilateral With CAD    Encounter for gynecological examination without abnormal finding        HPV (human papilloma virus) infection              Plan     1. Reviewed monthly self breast exams.  Instructed to call with lumps, pain, or breast discharge.  Yearly mammograms ordered.  2. Ordered mammogram today.  3. Reviewed exercise as a preventative health measures.   4. RTC in 1 year or PRN with problems.  5. Colonoscopy due 5/2022  6. HPV positive last year, repeat pap and hpv testing today.    Brook Braga MD  09/09/2021

## 2021-09-14 ENCOUNTER — TRANSCRIBE ORDERS (OUTPATIENT)
Dept: ADMINISTRATIVE | Facility: HOSPITAL | Age: 61
End: 2021-09-14

## 2021-09-14 DIAGNOSIS — Z12.31 VISIT FOR SCREENING MAMMOGRAM: Primary | ICD-10-CM

## 2021-09-21 DIAGNOSIS — Z01.419 PAP TEST, AS PART OF ROUTINE GYNECOLOGICAL EXAMINATION: ICD-10-CM

## 2021-09-23 ENCOUNTER — OFFICE VISIT (OUTPATIENT)
Dept: OBSTETRICS AND GYNECOLOGY | Facility: CLINIC | Age: 61
End: 2021-09-23

## 2021-09-23 VITALS
BODY MASS INDEX: 26.16 KG/M2 | SYSTOLIC BLOOD PRESSURE: 136 MMHG | DIASTOLIC BLOOD PRESSURE: 74 MMHG | HEIGHT: 65 IN | WEIGHT: 157 LBS

## 2021-09-23 DIAGNOSIS — B97.7 HPV (HUMAN PAPILLOMA VIRUS) INFECTION: ICD-10-CM

## 2021-09-23 DIAGNOSIS — Z76.89 ENCOUNTER FOR BIOPSY: Primary | ICD-10-CM

## 2021-09-23 DIAGNOSIS — Z87.42 HISTORY OF ABNORMAL CERVICAL PAPANICOLAOU SMEAR: ICD-10-CM

## 2021-09-23 DIAGNOSIS — R87.612 LGSIL ON PAP SMEAR OF CERVIX: ICD-10-CM

## 2021-09-23 PROCEDURE — 57455 BIOPSY OF CERVIX W/SCOPE: CPT | Performed by: OBSTETRICS & GYNECOLOGY

## 2021-09-23 NOTE — PROGRESS NOTES
Colposcopy Procedure Note        Procedures    Indications: Jessika Mustafa is a 61 y.o. female, , whose Patient's last menstrual period was 2010.  She presents for follow up for evaluation of an abnormal PAP smear that showed low-grade squamous intraepithelial neoplasia (LGSIL - encompassing HPV,mild dysplasia,MYAH I). HR HPV non 16/18 positive. She understands the need for the procedure and is aware of the complications, including post-colposcopic vaginal bleeding, vaginal leukorrhea or cervicitis.  She is aware she may experience discomfort.  After being presented with the risk, benefits, and alternatives the patient wished to proceed.      Urine pregnancy test done in the office today was UPT not done. Menopausal     The patient has not had Gardasil.  She is a smoker.    Prior cervical treatment: prior colposcopy. 2018, ,     Procedure Details   The risks and benefits of the procedure and Verbal informed consent obtained.    She was positioned in the dorsal lithotomy position and a speculum was inserted into the vagina and excellent visualization of cervix achieved, cervix swabbed x 3 with acetic acid solution. The transformation zone was completely visualized.  A cervical biopsy was obtained at 12 o'clock.  An endocervical curettage was not performed.  This colposcopy was satisfactory.     Findings:  The procedure was notable for:  Physical Exam  Vitals and nursing note reviewed. Exam conducted with a chaperone present.   Constitutional:       Appearance: Normal appearance. She is well-developed.   HENT:      Head: Normocephalic and atraumatic.   Pulmonary:      Effort: Pulmonary effort is normal.   Genitourinary:     Exam position: Lithotomy position.            Comments: Cervix: no mosaicism, no punctation, no abnormal vasculature and acetowhite lesion(s) noted at 12 o'clock; .  Skin:     General: Skin is warm and dry.   Neurological:      Mental Status: She is alert and oriented to  person, place, and time.   Psychiatric:         Mood and Affect: Mood normal.         Behavior: Behavior normal.         Specimens: 12  Specimens labelled and sent to Pathology.    Complications: none.    The patient tolerated the procedure very well and with mild discomfort and bleeding.    Assessment and Plan    Problem List Items Addressed This Visit     None      Visit Diagnoses     Encounter for biopsy    -  Primary    Relevant Orders    Tissue Pathology Exam    History of abnormal cervical Papanicolaou smear        Relevant Orders    Tissue Pathology Exam    LGSIL on Pap smear of cervix        Relevant Orders    Tissue Pathology Exam    HPV (human papilloma virus) infection              1. Will base further treatment on Pathology findings.  2. Treatment options discussed with patient.  3. Post biopsy instructions given to patient.  4. Will contact pt with path and plan  5. Pt chronically immunosuppressed    Brook Braga MD  09/23/2021

## 2021-09-23 NOTE — PATIENT INSTRUCTIONS
Colposcopy, Care After  This sheet gives you information about how to care for yourself after your procedure. Your doctor may also give you more specific instructions. If you have problems or questions, contact your doctor.  What can I expect after the procedure?  If you did not have a sample of your tissue taken out (did not have a biopsy), you may only have some spotting of blood for a few days. You can go back to your normal activities.  If you had a sample of your tissue taken out, it is common to have:  · Soreness and mild pain. These may last for a few days.  · A light-headed feeling.  · Mild bleeding or fluid (discharge) coming from your vagina. The fluid will look dark and grainy. You may have this for a few days. The fluid may be caused by a liquid that was used during your procedure. You may need to wear a sanitary pad.  · Spotting of blood for at least 48 hours after the procedure.  Follow these instructions at home:    Medicines  · Take over-the-counter and prescription medicines only as told by your doctor.  · Ask your doctor what medicines you can start taking again. This is very important if you take blood thinners.  Activity  · Limit your activity for the first day after your procedure as told by your doctor.  · For at least 3 days, or for as long as told by your doctor, avoid:  ? Douching.  ? Using tampons.  ? Having sex.  · Return to your normal activities as told by your doctor. Ask your doctor what activities are safe for you.  General instructions  · Drink enough fluid to keep your pee (urine) pale yellow.  · Ask your doctor if you may take baths, swim, or use a hot tub. You may take showers.  · If you use birth control (contraception), keep using it.  · Keep all follow-up visits as told by your doctor. This is important.  Contact a doctor if:  · You get a skin rash.  Get help right away if:  · You bleed a lot from your vagina. A lot of bleeding means you use more than one pad an hour for 2  hours in a row.  · You have clumps of blood (blood clots) coming from your vagina.  · You have a fever or chills.  · You have signs of infection. This may be fluid coming from your vagina that is:  ? Different than normal.  ? Yellow.  ? Bad-smelling.  · You have very bad pain or cramps in your lower belly that do not get better with medicine.  · You faint.  Summary  · If you did not have a sample of your tissue taken out, you may only have some spotting of blood for a few days. You can go back to your normal activities.  · If you had a sample of your tissue taken out, it is common to have mild pain for a few days and spotting for 48 hours.  · Avoid douching, using tampons, and having sex for at least 3 days after the procedure or for as long as told.  · Get help right away if you have a lot of bleeding, very bad pain, or signs of infection.  This information is not intended to replace advice given to you by your health care provider. Make sure you discuss any questions you have with your health care provider.  Document Revised: 12/16/2020 Document Reviewed: 12/16/2020  Elsevier Patient Education © 2021 [employer]

## 2021-09-28 DIAGNOSIS — Z76.89 ENCOUNTER FOR BIOPSY: ICD-10-CM

## 2021-09-28 DIAGNOSIS — Z87.42 HISTORY OF ABNORMAL CERVICAL PAPANICOLAOU SMEAR: ICD-10-CM

## 2021-09-28 DIAGNOSIS — R87.612 LGSIL ON PAP SMEAR OF CERVIX: ICD-10-CM

## 2021-09-29 NOTE — PROGRESS NOTES
Please call patient about results.   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  Plan repeat pap and Hpv testing in 1 year.  Stop smoking

## 2022-06-23 ENCOUNTER — HOSPITAL ENCOUNTER (OUTPATIENT)
Dept: MAMMOGRAPHY | Facility: HOSPITAL | Age: 62
Discharge: HOME | End: 2022-06-23
Admitting: FAMILY MEDICINE

## 2022-06-23 DIAGNOSIS — Z12.31 VISIT FOR SCREENING MAMMOGRAM: ICD-10-CM

## 2022-06-23 PROCEDURE — 77063 BREAST TOMOSYNTHESIS BI: CPT

## 2022-06-23 PROCEDURE — 77067 SCR MAMMO BI INCL CAD: CPT

## 2022-06-23 PROCEDURE — 77063 BREAST TOMOSYNTHESIS BI: CPT | Performed by: RADIOLOGY

## 2022-06-23 PROCEDURE — 77067 SCR MAMMO BI INCL CAD: CPT | Performed by: RADIOLOGY

## 2022-09-15 ENCOUNTER — OFFICE VISIT (OUTPATIENT)
Dept: OBSTETRICS AND GYNECOLOGY | Facility: CLINIC | Age: 62
End: 2022-09-15

## 2022-09-15 VITALS
DIASTOLIC BLOOD PRESSURE: 72 MMHG | HEIGHT: 65 IN | BODY MASS INDEX: 27.82 KG/M2 | WEIGHT: 167 LBS | SYSTOLIC BLOOD PRESSURE: 138 MMHG

## 2022-09-15 DIAGNOSIS — Z01.419 WOMEN'S ANNUAL ROUTINE GYNECOLOGICAL EXAMINATION: Primary | ICD-10-CM

## 2022-09-15 DIAGNOSIS — Z12.31 BREAST CANCER SCREENING BY MAMMOGRAM: ICD-10-CM

## 2022-09-15 PROCEDURE — 99396 PREV VISIT EST AGE 40-64: CPT | Performed by: OBSTETRICS & GYNECOLOGY

## 2022-09-15 NOTE — PROGRESS NOTES
Gynecologic Annual Exam Note        GYN Annual Exam     CC - Here for annual exam.        HPI  Jessika Mustafa is a 62 y.o. female, , who presents for annual well woman exam as a established patient.  She is postmenopausal. Denies vaginal bleeding.  Patient reports problems with: none. There were no changes to her medical or surgical history since her last visit.. Partner Status: Marital Status: single.  She is is sexually active. She has not had new partners.. STD testing recommendations have been explained to the patient and she does not desire STD testing.    Additional OB/GYN History   On HRT? No    Last Pap : 2021. Results: LSIL. HPV: HPV pool +  Last Completed Pap Smear     This patient has no relevant Health Maintenance data.        History of abnormal Pap smear: yes - colpo's in the past 3 years  Family history of uterine, colon, breast, or ovarian cancer: yes - paternal cousin - breast Ca  Performs monthly Self-Breast Exam: yes  Last mammogram: 2022. Done at [facility].    Last Completed Mammogram          Ordered - MAMMOGRAM (Every 2 Years) Ordered on 9/15/2022    2022  Mammo Screening Digital Tomosynthesis Bilateral With CAD    2021  Mammo Screening Technical Recall Left    2021  Mammo Screening Digital Tomosynthesis Bilateral With CAD    2020  Mammo Screening Digital Tomosynthesis Bilateral With CAD    2019  Mammo Screening Digital Tomosynthesis Bilateral With CAD    Only the first 5 history entries have been loaded, but more history exists.              Last colonoscopy: has had a colonoscopy 5 year(s) ago.    Last Completed Colonoscopy     This patient has no relevant Health Maintenance data.            Last bone density scan (DEXA): On 9/3/2020 and results were Osteopenia  Exercises Regularly: yes  Feelings of Anxiety or Depression: no      Tobacco Usage?: Yes Jessika Mustafa  reports that she has been smoking cigarettes. She has a 15.00 pack-year smoking  history. She has never used smokeless tobacco.. I have educated her on the risk of diseases from using tobacco products such as cancer, COPD and heart disease.     I advised her to quit and she is not willing to quit.    I spent 5 minutes counseling the patient.            Current Outpatient Medications:   •  citalopram (CeleXA) 20 MG tablet, , Disp: , Rfl:   •  Dietary Management Product (RHEUMATE PO), Take 1 capsule by mouth., Disp: , Rfl:   •  KLOR-CON 20 MEQ CR tablet, , Disp: , Rfl:   •  latanoprost (XALATAN) 0.005 % ophthalmic solution, , Disp: , Rfl:   •  olmesartan-hydrochlorothiazide (BENICAR HCT) 40-12.5 MG per tablet, , Disp: , Rfl:   •  SUMAtriptan (IMITREX) 100 MG tablet, , Disp: , Rfl:   •  Xeljanz XR 11 MG tablet sustained-release 24 hour, , Disp: , Rfl:     Patient denies the need for medication refills today.    OB History        0    Para   0    Term   0       0    AB   0    Living   0       SAB   0    IAB   0    Ectopic   0    Molar   0    Multiple   0    Live Births   0                Past Medical History:   Diagnosis Date   • Arthritis, rheumatoid (HCC)    • History of abnormal cervical Papanicolaou smear     HPV + non 16/18   • Hyperlipidemia    • Hypertension    • Osteopenia         Past Surgical History:   Procedure Laterality Date   • COLONOSCOPY  2017    benign polyps; repeat in 5 years   • COLPOSCOPY W/ BIOPSY / CURETTAGE      10/15/2020, 2019, 2018   • KNEE SURGERY Bilateral    • TUBAL ABDOMINAL LIGATION     • WISDOM TOOTH EXTRACTION         Health Maintenance   Topic Date Due   • ANNUAL PHYSICAL  Never done   • Pneumococcal Vaccine 0-64 (1 - PCV) Never done   • HEPATITIS C SCREENING  Never done   • COVID-19 Vaccine (4 - Booster for Pfizer series) 2022   • COLORECTAL CANCER SCREENING  2022   • DXA SCAN  2022   • PAP SMEAR  2022   • Annual Gynecologic Pelvic and Breast Exam  09/10/2022   • LIPID PANEL  Never done   • INFLUENZA  "VACCINE  10/01/2022   • MAMMOGRAM  06/23/2024   • TDAP/TD VACCINES (2 - Td or Tdap) 05/25/2025   • ZOSTER VACCINE  Completed       The additional following portions of the patient's history were reviewed and updated as appropriate: allergies, current medications, past family history, past medical history, past social history and past surgical history.    Review of Systems   Constitutional: Negative.    HENT: Negative.    Eyes: Negative.    Respiratory: Negative.    Cardiovascular: Negative.    Gastrointestinal: Negative.    Endocrine: Negative.    Genitourinary: Negative.    Musculoskeletal: Negative.    Allergic/Immunologic: Negative.    Neurological: Negative.    Hematological: Negative.    Psychiatric/Behavioral: Negative.        I have reviewed and agree with the HPI, ROS, and historical information as entered above. Brook Braga MD    Objective   /72   Ht 165.1 cm (65\")   Wt 75.8 kg (167 lb)   LMP 12/01/2010   BMI 27.79 kg/m²     Physical Exam  Vitals and nursing note reviewed. Exam conducted with a chaperone present.   Constitutional:       General: She is awake.   HENT:      Head: Normocephalic and atraumatic.   Neck:      Thyroid: No thyroid mass, thyromegaly or thyroid tenderness.   Cardiovascular:      Rate and Rhythm: Normal rate.      Heart sounds: No murmur heard.    No friction rub. No gallop.   Pulmonary:      Effort: Pulmonary effort is normal.      Breath sounds: Normal breath sounds. No stridor. No wheezing, rhonchi or rales.   Chest:      Chest wall: No mass or tenderness.   Breasts:      Right: Normal. No bleeding, inverted nipple, mass, nipple discharge, skin change, tenderness, axillary adenopathy or supraclavicular adenopathy.      Left: Normal. No bleeding, inverted nipple, mass, nipple discharge, skin change, tenderness, axillary adenopathy or supraclavicular adenopathy.       Abdominal:      Palpations: Abdomen is soft.      Tenderness: There is no guarding or rebound.      " Hernia: There is no hernia in the left inguinal area or right inguinal area.   Genitourinary:     General: Normal vulva.      Pubic Area: No rash.       Labia:         Right: No rash, tenderness, lesion or injury.         Left: No rash, tenderness, lesion or injury.       Urethra: No prolapse, urethral swelling or urethral lesion.      Vagina: No foreign body. No vaginal discharge, erythema, tenderness, bleeding or lesions.      Cervix: No cervical motion tenderness, discharge, friability, lesion, erythema or cervical bleeding.      Uterus: Normal. Not deviated, not enlarged, not fixed and not tender.       Adnexa: Right adnexa normal and left adnexa normal.        Right: No mass, tenderness or fullness.          Left: No mass, tenderness or fullness.        Rectum: No external hemorrhoid.   Musculoskeletal:      Cervical back: Normal range of motion.   Lymphadenopathy:      Upper Body:      Right upper body: No supraclavicular or axillary adenopathy.      Left upper body: No supraclavicular or axillary adenopathy.   Skin:     General: Skin is warm.   Neurological:      Mental Status: She is alert and oriented to person, place, and time.   Psychiatric:         Mood and Affect: Mood and affect normal.         Speech: Speech normal.            Assessment and Plan    Problem List Items Addressed This Visit    None     Visit Diagnoses     Women's annual routine gynecological examination    -  Primary    Relevant Orders    LIQUID-BASED PAP SMEAR, [facility] ([facility])    Breast cancer screening by mammogram        Relevant Orders    Mammo Screening Digital Tomosynthesis Bilateral With CAD          1. GYN annual well woman exam.   2. Reviewed monthly self breast exams.  Instructed to call with lumps, pain, or breast discharge.  Yearly mammograms ordered.  3. Ordered mammogram today.  4. RTC in 1 year or PRN with problems.  5. Return in about 1 year (around 9/15/2023) for Annual physical.     Brook Braga,  MD  09/15/2022

## 2022-09-19 LAB — REF LAB TEST METHOD: NORMAL

## 2022-09-19 NOTE — PROGRESS NOTES
Please call patient about results.   This pt had ASCUS pap, but we need to add on HPV testing please

## 2022-09-20 NOTE — PROGRESS NOTES
Please call patient about results.   She has pap of ASCUS, HPV + but she is 16 and 18 negative.  Given her history and immunosuppresion, this has been stable for many years.  Typically I would recommend a colpo, but if she is comfortable with it then we can repeat pap in 1 year.  Stop smoking!!  If she wants colpo that's fine as well.   I think she will just continue to have low grade/stable paps most likely.

## 2023-09-25 ENCOUNTER — OFFICE VISIT (OUTPATIENT)
Dept: OBSTETRICS AND GYNECOLOGY | Facility: CLINIC | Age: 63
End: 2023-09-25

## 2023-09-25 VITALS
BODY MASS INDEX: 27.89 KG/M2 | DIASTOLIC BLOOD PRESSURE: 82 MMHG | SYSTOLIC BLOOD PRESSURE: 142 MMHG | HEIGHT: 65 IN | WEIGHT: 167.4 LBS

## 2023-09-25 DIAGNOSIS — Z87.39 HISTORY OF OSTEOPENIA: ICD-10-CM

## 2023-09-25 DIAGNOSIS — Z13.820 SCREENING FOR OSTEOPOROSIS: ICD-10-CM

## 2023-09-25 DIAGNOSIS — Z01.419 ENCOUNTER FOR ANNUAL ROUTINE GYNECOLOGICAL EXAMINATION: Primary | ICD-10-CM

## 2023-09-25 DIAGNOSIS — Z86.19 HISTORY OF HPV INFECTION: ICD-10-CM

## 2023-09-25 RX ORDER — MELATONIN
1000 DAILY
COMMUNITY

## 2023-09-25 NOTE — PROGRESS NOTES
Gynecologic Annual Exam Note        GYN Annual Exam     CC - Here for annual exam.        HPI  Jessika Mustafa is a 63 y.o. female, , who presents for annual well woman exam as a established patient.  She is postmenopausal. Denies vaginal bleeding.  Patient reports problems with:  None . There were no changes to her medical or surgical history since her last visit.. Partner Status: Marital Status: single.  She is is sexually active. She has not had new partners.. STD testing recommendations have been explained to the patient and she does not desire STD testing.    Additional OB/GYN History   On HRT? No    Last Pap : 9/15/22. Results: ASCUS. HPV: HPV pool +.   Last Completed Pap Smear       This patient has no relevant Health Maintenance data.          History of abnormal Pap smear: yes - Yes   HX of HPV with ASCUS, LSIL on Colpo 21  Family history of uterine, colon, breast, or ovarian cancer: no  Performs monthly Self-Breast Exam: no  Last mammogram: 23. Done at  [facility]    Last Completed Mammogram            MAMMOGRAM (Every 2 Years) Next due on 2023  Mammo Screening Digital Tomosynthesis Bilateral With CAD    2022  Mammo Screening Digital Tomosynthesis Bilateral With CAD    2021  Mammo Screening Technical Recall Left    2021  Mammo Screening Digital Tomosynthesis Bilateral With CAD    2020  Mammo Screening Digital Tomosynthesis Bilateral With CAD    Only the first 5 history entries have been loaded, but more history exists.                  Last colonoscopy: has had a colonoscopy 1 year(s) ago. Every 5 yrs. Hx of polyps.    Last Completed Colonoscopy       This patient has no relevant Health Maintenance data.              Last bone density scan (DEXA): On 9/3/2020 and results were Osteopenia.  Exercises Regularly: yes  Feelings of Anxiety or Depression: no      Tobacco Usage?: Yes Jessika Mustafa  reports that she has been smoking cigarettes. She  has a 15.00 pack-year smoking history. She has never used smokeless tobacco. I have educated her on the risk of diseases from using tobacco products such as cancer, COPD, and heart disease.     I advised her to quit and she is willing to quit. We have discussed the following method/s for tobacco cessation:  Medication, counseling, Cold turkey.  She does not have a definitive quit date at this time. She will try to stop when she starts Chantix.  Pt states that she has a prescription for chantix and plans to start soon.    I spent 3  minutes counseling the patient.            Current Outpatient Medications:     Cholecalciferol 25 MCG (1000 UT) tablet, Take 1 tablet by mouth Daily. Patient takes 2, Disp: , Rfl:     citalopram (CeleXA) 20 MG tablet, , Disp: , Rfl:     Dietary Management Product (RHEUMATE PO), Take 1 capsule by mouth., Disp: , Rfl:     KLOR-CON 20 MEQ CR tablet, , Disp: , Rfl:     latanoprost (XALATAN) 0.005 % ophthalmic solution, , Disp: , Rfl:     olmesartan-hydrochlorothiazide (BENICAR HCT) 40-12.5 MG per tablet, , Disp: , Rfl:     SUMAtriptan (IMITREX) 100 MG tablet, , Disp: , Rfl:     Xeljanz XR 11 MG tablet sustained-release 24 hour, , Disp: , Rfl:     Patient denies the need for medication refills today.    OB History          0    Para   0    Term   0       0    AB   0    Living   0         SAB   0    IAB   0    Ectopic   0    Molar   0    Multiple   0    Live Births   0                Past Medical History:   Diagnosis Date    Arthritis, rheumatoid     History of abnormal cervical Papanicolaou smear     HPV + non 16/18    Hyperlipidemia     Hypertension     Osteopenia         Past Surgical History:   Procedure Laterality Date    COLONOSCOPY  2017    benign polyps; repeat in 5 years    COLPOSCOPY W/ BIOPSY / CURETTAGE      10/15/2020, 2019, 2018    KNEE SURGERY Bilateral     TUBAL ABDOMINAL LIGATION  2006    WISDOM TOOTH EXTRACTION         Health Maintenance  "  Topic Date Due    BMI FOLLOWUP  Never done    Pneumococcal Vaccine 0-64 (1 - PCV) Never done    HEPATITIS C SCREENING  Never done    ANNUAL PHYSICAL  Never done    COLORECTAL CANCER SCREENING  05/01/2022    DXA SCAN  09/03/2022    PAP SMEAR  09/15/2023    INFLUENZA VACCINE  10/01/2023    LIPID PANEL  08/02/2024    Annual Gynecologic Pelvic and Breast Exam  09/26/2024    TDAP/TD VACCINES (2 - Td or Tdap) 05/25/2025    MAMMOGRAM  06/26/2025    COVID-19 Vaccine  Completed    ZOSTER VACCINE  Completed       The additional following portions of the patient's history were reviewed and updated as appropriate: allergies, current medications, past family history, past medical history, past social history, past surgical history, and problem list.    Review of Systems   Respiratory: Negative.  Negative for shortness of breath.    Cardiovascular: Negative.  Negative for chest pain.   Gastrointestinal: Negative.  Negative for abdominal distention, abdominal pain and constipation.   Genitourinary:  Negative for breast discharge, breast lump, breast pain, dyspareunia, dysuria, pelvic pain, pelvic pressure, urinary incontinence, vaginal bleeding, vaginal discharge and vaginal pain.   Psychiatric/Behavioral: Negative.  Negative for depressed mood. The patient is not nervous/anxious.      I have reviewed and agree with the HPI, ROS, and historical information as entered above. Edith Hull, APRN      Objective   /82   Ht 165.1 cm (65\")   Wt 75.9 kg (167 lb 6.4 oz)   LMP 12/01/2010   BMI 27.86 kg/m²     Physical Exam  Vitals and nursing note reviewed. Exam conducted with a chaperone present.   Constitutional:       Appearance: Normal appearance. She is well-developed. She is not ill-appearing.   HENT:      Head: Normocephalic and atraumatic.   Neck:      Thyroid: No thyroid mass, thyromegaly or thyroid tenderness.   Cardiovascular:      Rate and Rhythm: Normal rate and regular rhythm.      Heart sounds: Normal heart " sounds. No murmur heard.  Pulmonary:      Effort: Pulmonary effort is normal. No retractions.      Breath sounds: Normal breath sounds. No wheezing, rhonchi or rales.   Chest:      Chest wall: No mass or tenderness.   Breasts:     Breasts are symmetrical.      Right: Normal. No mass, nipple discharge, skin change or tenderness.      Left: Normal. No mass, nipple discharge, skin change or tenderness.   Abdominal:      Palpations: Abdomen is soft. Abdomen is not rigid. There is no mass.      Tenderness: There is no abdominal tenderness. There is no guarding or rebound.      Hernia: No hernia is present. There is no hernia in the left inguinal area or right inguinal area.   Genitourinary:     General: Normal vulva.      Labia:         Right: No rash, tenderness or lesion.         Left: No rash, tenderness or lesion.       Vagina: Normal. No vaginal discharge, erythema, tenderness, bleeding or lesions.      Cervix: No cervical motion tenderness, discharge, friability, lesion, erythema or cervical bleeding.      Uterus: Normal. Not enlarged, not fixed and not tender.       Adnexa: Right adnexa normal and left adnexa normal.        Right: No mass or tenderness.          Left: No mass or tenderness.        Rectum: No external hemorrhoid.      Comments: Atrophic vaginal tissue noted.   Musculoskeletal:      Cervical back: Normal range of motion. No muscular tenderness.   Lymphadenopathy:      Upper Body:      Right upper body: No supraclavicular or axillary adenopathy.      Left upper body: No supraclavicular or axillary adenopathy.   Neurological:      Mental Status: She is alert and oriented to person, place, and time.   Psychiatric:         Mood and Affect: Mood normal.         Behavior: Behavior normal.          Assessment and Plan    Problem List Items Addressed This Visit          Genitourinary and Reproductive     History of HPV infection    Relevant Orders    LIQUID-BASED PAP SMEAR WITH HPV GENOTYPING REGARDLESS OF  INTERPRETATION ([facility])       Musculoskeletal and Injuries    History of osteopenia    Relevant Orders    DEXA Bone Density Axial     Other Visit Diagnoses       Encounter for annual routine gynecological examination    -  Primary    Relevant Orders    LIQUID-BASED PAP SMEAR WITH HPV GENOTYPING REGARDLESS OF INTERPRETATION ([facility])    Screening for osteoporosis        Relevant Orders    DEXA Bone Density Axial            GYN annual well woman exam.   Pap guidelines reviewed. Pap today.   Reviewed monthly self breast exams.  Instructed to call with lumps, pain, or breast discharge.  Yearly mammograms advised.  Recommended use of Vitamin D and getting adequate calcium in her diet. (1500mg)  Osteoporosis screening ordered today.  Reviewed exercise as a preventative health measures.   Colonoscopy recommended after 5 yrs.  Reviewed [facility] ovarian cancer screening program.  Repeat BP in office [id number] (x2 on right arm, x1 on left arm). Reports being asymptomatic. She states she checks BP at home and it is never that high. Patient instructed to follow up with PCP for BP check and medication adjustment if needed. HTN education provided.   Instructed on Kegal exercises and gave patient educational information.  Smoking cessation with Chantix. Education provided.  Return for DEXA scan in 1 month and Annual physical in 1 year or sooner if needed.     Edith Hull, APRN  09/25/2023

## 2023-09-27 LAB — REF LAB TEST METHOD: NORMAL

## 2023-11-15 ENCOUNTER — TELEPHONE (OUTPATIENT)
Dept: OBSTETRICS AND GYNECOLOGY | Facility: CLINIC | Age: 63
End: 2023-11-15

## 2023-12-21 ENCOUNTER — TELEPHONE (OUTPATIENT)
Dept: OBSTETRICS AND GYNECOLOGY | Facility: CLINIC | Age: 63
End: 2023-12-21

## 2023-12-22 NOTE — TELEPHONE ENCOUNTER
SARBJIT pt. She reports not getting adequate calcium in diet and will increase PO servings. She VU of results and will attempt to stop smoking.

## 2024-03-03 ENCOUNTER — HOSPITAL ENCOUNTER (EMERGENCY)
Facility: HOSPITAL | Age: 64
Discharge: HOME | End: 2024-03-04
Attending: EMERGENCY MEDICINE
Payer: COMMERCIAL

## 2024-03-03 ENCOUNTER — APPOINTMENT (OUTPATIENT)
Dept: CT IMAGING | Facility: HOSPITAL | Age: 64
End: 2024-03-03
Payer: COMMERCIAL

## 2024-03-03 VITALS
OXYGEN SATURATION: 100 % | SYSTOLIC BLOOD PRESSURE: 147 MMHG | DIASTOLIC BLOOD PRESSURE: 85 MMHG | RESPIRATION RATE: 18 BRPM | HEIGHT: 65 IN | TEMPERATURE: 98.3 F | BODY MASS INDEX: 28.32 KG/M2 | WEIGHT: 170 LBS | HEART RATE: 80 BPM

## 2024-03-03 DIAGNOSIS — R31.9 HEMATURIA, UNSPECIFIED TYPE: ICD-10-CM

## 2024-03-03 DIAGNOSIS — R10.9 ACUTE LEFT FLANK PAIN: Primary | ICD-10-CM

## 2024-03-03 LAB
BACTERIA UR QL AUTO: ABNORMAL /HPF
BASOPHILS # BLD AUTO: 0.02 10*3/MM3 (ref 0–0.2)
BASOPHILS NFR BLD AUTO: 0.3 % (ref 0–1.5)
BILIRUB UR QL STRIP: NEGATIVE
CLARITY UR: CLEAR
COLOR UR: YELLOW
DEPRECATED RDW RBC AUTO: 41.5 FL (ref 37–54)
EOSINOPHIL # BLD AUTO: 0.01 10*3/MM3 (ref 0–0.4)
EOSINOPHIL NFR BLD AUTO: 0.2 % (ref 0.3–6.2)
ERYTHROCYTE [DISTWIDTH] IN BLOOD BY AUTOMATED COUNT: 12.6 % (ref 12.3–15.4)
GLUCOSE UR STRIP-MCNC: NEGATIVE MG/DL
HCT VFR BLD AUTO: 41.2 % (ref 34–46.6)
HGB BLD-MCNC: 14.1 G/DL (ref 12–15.9)
HGB UR QL STRIP.AUTO: ABNORMAL
HYALINE CASTS UR QL AUTO: ABNORMAL /LPF
IMM GRANULOCYTES # BLD AUTO: 0.02 10*3/MM3 (ref 0–0.05)
IMM GRANULOCYTES NFR BLD AUTO: 0.3 % (ref 0–0.5)
KETONES UR QL STRIP: NEGATIVE
LEUKOCYTE ESTERASE UR QL STRIP.AUTO: NEGATIVE
LYMPHOCYTES # BLD AUTO: 1.42 10*3/MM3 (ref 0.7–3.1)
LYMPHOCYTES NFR BLD AUTO: 23.5 % (ref 19.6–45.3)
MCH RBC QN AUTO: 30.3 PG (ref 26.6–33)
MCHC RBC AUTO-ENTMCNC: 34.2 G/DL (ref 31.5–35.7)
MCV RBC AUTO: 88.6 FL (ref 79–97)
MONOCYTES # BLD AUTO: 0.33 10*3/MM3 (ref 0.1–0.9)
MONOCYTES NFR BLD AUTO: 5.5 % (ref 5–12)
NEUTROPHILS NFR BLD AUTO: 4.23 10*3/MM3 (ref 1.7–7)
NEUTROPHILS NFR BLD AUTO: 70.2 % (ref 42.7–76)
NITRITE UR QL STRIP: NEGATIVE
NRBC BLD AUTO-RTO: 0 /100 WBC (ref 0–0.2)
PH UR STRIP.AUTO: 5.5 [PH] (ref 5–8)
PLATELET # BLD AUTO: 269 10*3/MM3 (ref 140–450)
PMV BLD AUTO: 9.4 FL (ref 6–12)
PROT UR QL STRIP: NEGATIVE
RBC # BLD AUTO: 4.65 10*6/MM3 (ref 3.77–5.28)
RBC # UR STRIP: ABNORMAL /HPF
REF LAB TEST METHOD: ABNORMAL
SP GR UR STRIP: 1.01 (ref 1–1.03)
SQUAMOUS #/AREA URNS HPF: ABNORMAL /HPF
UROBILINOGEN UR QL STRIP: ABNORMAL
WBC # UR STRIP: ABNORMAL /HPF
WBC NRBC COR # BLD AUTO: 6.03 10*3/MM3 (ref 3.4–10.8)

## 2024-03-03 PROCEDURE — 74176 CT ABD & PELVIS W/O CONTRAST: CPT

## 2024-03-03 PROCEDURE — 80053 COMPREHEN METABOLIC PANEL: CPT

## 2024-03-03 PROCEDURE — 85025 COMPLETE CBC W/AUTO DIFF WBC: CPT

## 2024-03-03 PROCEDURE — 81001 URINALYSIS AUTO W/SCOPE: CPT

## 2024-03-03 PROCEDURE — 83605 ASSAY OF LACTIC ACID: CPT

## 2024-03-03 PROCEDURE — 83690 ASSAY OF LIPASE: CPT

## 2024-03-03 PROCEDURE — 99284 EMERGENCY DEPT VISIT MOD MDM: CPT

## 2024-03-03 RX ORDER — SODIUM CHLORIDE 9 MG/ML
10 INJECTION, SOLUTION INTRAMUSCULAR; INTRAVENOUS; SUBCUTANEOUS AS NEEDED
Status: DISCONTINUED | OUTPATIENT
Start: 2024-03-03 | End: 2024-03-04 | Stop reason: HOSPADM

## 2024-03-04 LAB
ALBUMIN SERPL-MCNC: 4.5 G/DL (ref 3.5–5.2)
ALBUMIN/GLOB SERPL: 1.3 G/DL
ALP SERPL-CCNC: 81 U/L (ref 39–117)
ALT SERPL W P-5'-P-CCNC: 9 U/L (ref 1–33)
ANION GAP SERPL CALCULATED.3IONS-SCNC: 12 MMOL/L (ref 5–15)
AST SERPL-CCNC: 16 U/L (ref 1–32)
BILIRUB SERPL-MCNC: 0.8 MG/DL (ref 0–1.2)
BUN SERPL-MCNC: 8 MG/DL (ref 8–23)
BUN/CREAT SERPL: 10.4 (ref 7–25)
CALCIUM SPEC-SCNC: 9.6 MG/DL (ref 8.6–10.5)
CHLORIDE SERPL-SCNC: 104 MMOL/L (ref 98–107)
CO2 SERPL-SCNC: 27 MMOL/L (ref 22–29)
CREAT SERPL-MCNC: 0.77 MG/DL (ref 0.57–1)
D-LACTATE SERPL-SCNC: 1.4 MMOL/L (ref 0.5–2)
EGFRCR SERPLBLD CKD-EPI 2021: 87.9 ML/MIN/1.73
GLOBULIN UR ELPH-MCNC: 3.4 GM/DL
GLUCOSE SERPL-MCNC: 120 MG/DL (ref 65–99)
HOLD SPECIMEN: NORMAL
LIPASE SERPL-CCNC: 19 U/L (ref 13–60)
POTASSIUM SERPL-SCNC: 3.5 MMOL/L (ref 3.5–5.2)
PROT SERPL-MCNC: 7.9 G/DL (ref 6–8.5)
SODIUM SERPL-SCNC: 143 MMOL/L (ref 136–145)
WHOLE BLOOD HOLD COAG: NORMAL
WHOLE BLOOD HOLD SPECIMEN: NORMAL

## 2024-03-04 RX ORDER — ALUMINA, MAGNESIA, AND SIMETHICONE 2400; 2400; 240 MG/30ML; MG/30ML; MG/30ML
10 SUSPENSION ORAL ONCE
Status: COMPLETED | OUTPATIENT
Start: 2024-03-04 | End: 2024-03-04

## 2024-03-04 RX ADMIN — ALUMINUM HYDROXIDE, MAGNESIUM HYDROXIDE, AND DIMETHICONE 10 ML: 400; 400; 40 SUSPENSION ORAL at 01:01

## 2024-03-04 NOTE — ED PROVIDER NOTES
[place]    EMERGENCY DEPARTMENT ENCOUNTER      Pt Name: Jessika Mustafa  MRN: [id number]  YOB: 1960  Date of evaluation: 3/3/2024  Provider: Milind Ellsworth MD    CHIEF COMPLAINT       Chief Complaint   Patient presents with    Abdominal Pain         HISTORY OF PRESENT ILLNESS   Jessika Mustafa is a 63 y.o. female who presents to the emergency department ***       Nursing notes were reviewed.    REVIEW OF SYSTEMS     ROS:  A chief complaint appropriate review of systems was completed and is negative except as noted in the HPI.      PAST MEDICAL HISTORY     Past Medical History:   Diagnosis Date    Arthritis, rheumatoid     History of abnormal cervical Papanicolaou smear     HPV + non 16/18    Hyperlipidemia 2022    Hypertension     Osteopenia          SURGICAL HISTORY       Past Surgical History:   Procedure Laterality Date    COLONOSCOPY  05/01/2017    benign polyps; repeat in 5 years    COLPOSCOPY W/ BIOPSY / CURETTAGE      10/15/2020, 9/19/2019, 09/06/2018    KNEE SURGERY Bilateral     TUBAL ABDOMINAL LIGATION  2006    WISDOM TOOTH EXTRACTION           CURRENT MEDICATIONS       Current Facility-Administered Medications:     Sodium Chloride (PF) 0.9 % 10 mL, 10 mL, Intravenous, PRN, Emergency, Triage Protocol, MD    Current Outpatient Medications:     Cholecalciferol 25 MCG (1000 UT) tablet, Take 1 tablet by mouth Daily. Patient takes 2, Disp: , Rfl:     citalopram (CeleXA) 20 MG tablet, , Disp: , Rfl:     Dietary Management Product (RHEUMATE PO), Take 1 capsule by mouth., Disp: , Rfl:     KLOR-CON 20 MEQ CR tablet, , Disp: , Rfl:     latanoprost (XALATAN) 0.005 % ophthalmic solution, , Disp: , Rfl:     olmesartan-hydrochlorothiazide (BENICAR HCT) 40-12.5 MG per tablet, , Disp: , Rfl:     SUMAtriptan (IMITREX) 100 MG tablet, , Disp: , Rfl:     Xeljanz XR 11 MG tablet sustained-release 24 hour, , Disp: , Rfl:     ALLERGIES     Lisinopril, Losartan, and Metronidazole    FAMILY HISTORY       Family  "History   Problem Relation Age of Onset    Arthritis Father     Hypertension Father     Arthritis Mother     Diabetes Mother     Heart disease Mother     Hypertension Mother     Stroke Mother     Diabetes Brother     Hypertension Brother     Asthma Sister     Diabetes Paternal Grandmother     Diabetes Maternal Grandmother     Breast cancer Paternal Cousin     Ovarian cancer Neg Hx     Uterine cancer Neg Hx     Colon cancer Neg Hx           SOCIAL HISTORY       Social History     Socioeconomic History    Marital status: Single   Tobacco Use    Smoking status: Every Day     Current packs/day: 0.50     Average packs/day: 0.5 packs/day for 30.0 years (15.0 ttl pk-yrs)     Types: Cigarettes    Smokeless tobacco: Never    Tobacco comments:     30+ years, as of 09/09/2021   Vaping Use    Vaping status: Never Used   Substance and Sexual Activity    Alcohol use: Never    Drug use: Never    Sexual activity: Yes     Partners: Male     Birth control/protection: Tubal ligation         PHYSICAL EXAM    (up to 7 for level 4, 8 or more for level 5)     Vitals:    03/03/24 2309   BP: 147/85   BP Location: Right arm   Patient Position: Sitting   Pulse: 80   Resp: 18   Temp: 98.3 °F (36.8 °C)   TempSrc: Oral   SpO2: 100%   Weight: 77.1 kg (170 lb)   Height: 165.1 cm (65\")       ***      DIAGNOSTIC RESULTS     EKG: All EKGs are interpreted by the Emergency Department Physician who either signs or Co-signs this chart in the absence of a cardiologist.    No orders to display         RADIOLOGY:   [x] Radiologist's Report Reviewed:  CT Abdomen Pelvis Without Contrast   Final Result   Impression:   1.No acute abdominal or pelvic abnormality.   2.Mild atherosclerosis.            Electronically Signed: Jon Lua MD     3/3/2024 11:50 PM EST     Workstation ID: [id number]          I ordered and independently reviewed the above noted radiographic studies.        LABS:    I have reviewed and interpreted all of the currently available lab " results from this visit (if applicable):  Results for orders placed or performed during the hospital encounter of 03/03/24   Comprehensive Metabolic Panel    Specimen: Arm, Left; Blood   Result Value Ref Range    Glucose 120 (H) 65 - 99 mg/dL    BUN 8 8 - 23 mg/dL    Creatinine 0.77 0.57 - 1.00 mg/dL    Sodium 143 136 - 145 mmol/L    Potassium 3.5 3.5 - 5.2 mmol/L    Chloride 104 98 - 107 mmol/L    CO2 27.0 22.0 - 29.0 mmol/L    Calcium 9.6 8.6 - 10.5 mg/dL    Total Protein 7.9 6.0 - 8.5 g/dL    Albumin 4.5 3.5 - 5.2 g/dL    ALT (SGPT) 9 1 - 33 U/L    AST (SGOT) 16 1 - 32 U/L    Alkaline Phosphatase 81 39 - 117 U/L    Total Bilirubin 0.8 0.0 - 1.2 mg/dL    Globulin 3.4 gm/dL    A/G Ratio 1.3 g/dL    BUN/Creatinine Ratio 10.4 7.0 - 25.0    Anion Gap 12.0 5.0 - 15.0 mmol/L    eGFR 87.9 >60.0 mL/min/1.73   Lipase    Specimen: Arm, Left; Blood   Result Value Ref Range    Lipase 19 13 - 60 U/L   Urinalysis With Microscopic If Indicated (No Culture) - Urine, Clean Catch    Specimen: Urine, Clean Catch   Result Value Ref Range    Color, UA Yellow Yellow, Straw    Appearance, UA Clear Clear    pH, UA 5.5 5.0 - 8.0    Specific Gravity, UA 1.013 1.001 - 1.030    Glucose, UA Negative Negative    Ketones, UA Negative Negative    Bilirubin, UA Negative Negative    Blood, UA Trace (A) Negative    Protein, UA Negative Negative    Leuk Esterase, UA Negative Negative    Nitrite, UA Negative Negative    Urobilinogen, UA 0.2 E.U./dL 0.2 - 1.0 E.U./dL   Lactic Acid, Plasma    Specimen: Arm, Left; Blood   Result Value Ref Range    Lactate 1.4 0.5 - 2.0 mmol/L   CBC Auto Differential    Specimen: Arm, Left; Blood   Result Value Ref Range    WBC 6.03 3.40 - 10.80 10*3/mm3    RBC 4.65 3.77 - 5.28 10*6/mm3    Hemoglobin 14.1 12.0 - 15.9 g/dL    Hematocrit 41.2 34.0 - 46.6 %    MCV 88.6 79.0 - 97.0 fL    MCH 30.3 26.6 - 33.0 pg    MCHC 34.2 31.5 - 35.7 g/dL    RDW 12.6 12.3 - 15.4 %    RDW-SD 41.5 37.0 - 54.0 fl    MPV 9.4 6.0 - 12.0 fL     Platelets 269 140 - 450 10*3/mm3    Neutrophil % 70.2 42.7 - 76.0 %    Lymphocyte % 23.5 19.6 - 45.3 %    Monocyte % 5.5 5.0 - 12.0 %    Eosinophil % 0.2 (L) 0.3 - 6.2 %    Basophil % 0.3 0.0 - 1.5 %    Immature Grans % 0.3 0.0 - 0.5 %    Neutrophils, Absolute 4.23 1.70 - 7.00 10*3/mm3    Lymphocytes, Absolute 1.42 0.70 - 3.10 10*3/mm3    Monocytes, Absolute 0.33 0.10 - 0.90 10*3/mm3    Eosinophils, Absolute 0.01 0.00 - 0.40 10*3/mm3    Basophils, Absolute 0.02 0.00 - 0.20 10*3/mm3    Immature Grans, Absolute 0.02 0.00 - 0.05 10*3/mm3    nRBC 0.0 0.0 - 0.2 /100 WBC   Urinalysis, Microscopic Only - Urine, Clean Catch    Specimen: Urine, Clean Catch   Result Value Ref Range    RBC, UA 3-5 (A) None Seen, 0-2 /HPF    WBC, UA 0-2 None Seen, 0-2 /HPF    Bacteria, UA None Seen None Seen, Trace /HPF    Squamous Epithelial Cells, UA 0-2 None Seen, 0-2 /HPF    Hyaline Casts, UA 0-6 0 - 6 /LPF    Methodology Automated Microscopy    Green Top (Gel)   Result Value Ref Range    Extra Tube Hold for add-ons.    Lavender Top   Result Value Ref Range    Extra Tube hold for add-on    Gold Top - SST   Result Value Ref Range    Extra Tube Hold for add-ons.    Light Blue Top   Result Value Ref Range    Extra Tube Hold for add-ons.         If labs were ordered, I independently reviewed the results and considered them in treating the patient.      EMERGENCY DEPARTMENT COURSE and DIFFERENTIAL DIAGNOSIS/MDM:   Vitals:  AS OF 01:37 EST    BP - 147/85  HR - 80  TEMP - 98.3 °F (36.8 °C) (Oral)  O2 SATS - 100%        Discussion below represents my analysis of pertinent findings related to patient's condition, differential diagnosis, treatment plan and final disposition.      Differential diagnosis:  The differential diagnosis associated with the patient's presentation includes: ***      Independent interpretations (ECG/rhythm strip/X-ray/US/CT scan): ***      Additional sources:  Discussed/obtained information from independent historians:   []  Spouse:   [] Parent:   [] Friend:   [] EMS:   [] Other:  External (non-ED) record review:   [] Inpatient record:   [] Office record:   [] Outpatient record:   [] Prior Outpatient labs:   [] Prior Outpatient radiology:   [] Primary Care record:   [] Outside ED record:   [] Other:       Patient's care impacted by:   [] Diabetes   [] Hypertension   [] Coronary Artery Disease   [] Cancer   [] Other:     Care significantly affected by Social Determinants of Health (housing and economic circumstances, unemployment)    [] Yes     [] No   If yes, Patient's care significantly limited by  Social Determinants of Health including:    [] Inadequate housing    [] Low income    [] Alcoholism and drug addiction in family    [] Problems related to primary support group    [] Unemployment    [] Problems related to employment    [] Other Social Determinants of Health:       Consideration of admission/observation vs discharge: ***      I considered prescription management with: ***   [] Pain medication:   [] Antiviral:   [] Antibiotic:   [] Other:    Additional orders considered but not ordered:  The following testing was considered but ultimately not selected after discussion with patient/family: ***    ED Course:    ED Course as of 03/04/24 0137   Mon Mar 04, 2024   0043 On reexamination, patient.  Nontoxic.  Resting comfortably in bed with normal vital signs.  Small amount of hematuria noted on UA.  Possibility of stone fragments versus recently passed stone.  Also consider gastritis.  CT scan shows no evidence of acute intra-abdominal pathology including no surgical process.  Lab work is reassuring without any evidence of acute hepatic or pancreatic inflammation, significant leukocytosis, electrolyte derangement, anemia.  Patient stable for discharge home at this time with close outpatient follow-up. [NS]      ED Course User Index  [NS] Milind Ellsworth MD         ***    I had a discussion with the patient/family regarding  diagnosis, diagnostic results, treatment plan, and medications.  The patient/family indicated understanding of these instructions.  I spent adequate time at the bedside preceding discharge necessary to personally discuss the aftercare instructions, giving patient education, providing explanations of the results of our evaluations/findings, and my decision making to assure that the patient/family understand the plan of care.  Time was allotted to answer questions at that time and throughout the ED course.  Emphasis was placed on timely follow-up after discharge.  I also discussed the potential for the development of an acute emergent condition requiring further evaluation, admission, or even surgical intervention. I discussed that we found nothing during the visit today indicating the need for further workup, admission, or the presence of an unstable medical condition.  I encouraged the patient to return to the emergency department immediately for ANY concerns, worsening, new complaints, or if symptoms persist and unable to seek follow-up in a timely fashion.  The patient/family expressed understanding and agreement with this plan.  The patient will follow-up with their PCP in 1-2 days for reevaluation.           PROCEDURES:  Procedures    CRITICAL CARE TIME        FINAL IMPRESSION      1. Acute left flank pain    2. Hematuria, unspecified type          DISPOSITION/PLAN     ED Disposition       ED Disposition   Discharge    Condition   Stable    Comment   --                 Comment: Please note this report has been produced using speech recognition software.      Milind Ellsworth MD  Attending Emergency Physician           [id number]

## 2024-05-14 PROBLEM — M06.9 RHEUMATOID ARTHRITIS: Status: ACTIVE | Noted: 2024-05-14

## 2024-05-15 ENCOUNTER — OFFICE VISIT (OUTPATIENT)
Age: 64
End: 2024-05-15
Payer: COMMERCIAL

## 2024-05-15 VITALS
TEMPERATURE: 97.3 F | DIASTOLIC BLOOD PRESSURE: 80 MMHG | BODY MASS INDEX: 29.01 KG/M2 | SYSTOLIC BLOOD PRESSURE: 128 MMHG | WEIGHT: 174.1 LBS | HEART RATE: 66 BPM | HEIGHT: 65 IN

## 2024-05-15 DIAGNOSIS — Z79.899 IMMUNODEFICIENCY DUE TO DRUG THERAPY: Chronic | ICD-10-CM

## 2024-05-15 DIAGNOSIS — G47.8 POOR SLEEP PATTERN: Chronic | ICD-10-CM

## 2024-05-15 DIAGNOSIS — D84.821 IMMUNODEFICIENCY DUE TO DRUG THERAPY: Chronic | ICD-10-CM

## 2024-05-15 DIAGNOSIS — R53.83 OTHER FATIGUE: ICD-10-CM

## 2024-05-15 DIAGNOSIS — B99.9 RECURRENT INFECTIONS: Chronic | ICD-10-CM

## 2024-05-15 DIAGNOSIS — M05.79 RHEUMATOID ARTHRITIS INVOLVING MULTIPLE SITES WITH POSITIVE RHEUMATOID FACTOR: Primary | ICD-10-CM

## 2024-05-15 PROBLEM — Z13.220 NEED FOR LIPID SCREENING: Status: ACTIVE | Noted: 2024-05-15

## 2024-05-15 RX ORDER — TOFACITINIB 11 MG/1
11 TABLET, FILM COATED, EXTENDED RELEASE ORAL DAILY
Qty: 30 TABLET | Refills: 5 | Status: SHIPPED | OUTPATIENT
Start: 2024-05-15

## 2024-05-15 NOTE — PROGRESS NOTES
Office Visit       Date: 05/15/2024   Patient Name: Jessika Mustafa  MRN: [id number]  YOB: 1960    Referring Physician: Selin Marte PA     Chief Complaint: Rheumatoid arthritis    History of Present Illness: Jessika Mustafa is a 63 y.o. female who is here today for follow-up of seropositive rheumatoid arthritis.  Previously Humira was discontinued on 5/27/2022 due to repeat used/thrush in her esophagus. Orencia was started June 2020.  This was discontinued due to no efficacy.  She stopped methotrexate  in June 2021.  No change in symptoms off of the methotrexate.  She has had a chronic cough.  As she no longer takes omeprazole her cough had become more persistent.  Today she rates her pain 0 out of 10 and has no morning stiffness.  No joint pain or joint swelling.  She does have low back pain.  No neck pain or stiffness.  She is fatigued.  No sicca symptoms.  No wheezing or shortness of breath or chest pain.  She has abdominal pain.  No  issues.  No hair loss.  No headaches.  No paresthesias.  No rashes.  She does not bleed or bruise easily.  No lymphadenopathy.      Subjective      Past Medical History:   Past Medical History:   Diagnosis Date    Arthritis, rheumatoid     History of abnormal cervical Papanicolaou smear     HPV + non 16/18    Hyperlipidemia 2022    Hypertension     Migraine headache     Osteopenia     Rheumatoid arthritis        Past Surgical History:   Past Surgical History:   Procedure Laterality Date    COLONOSCOPY  05/01/2017    benign polyps; repeat in 5 years    COLPOSCOPY W/ BIOPSY / CURETTAGE      10/15/2020, 9/19/2019, 09/06/2018    KNEE CARTILAGE SURGERY      KNEE SURGERY Bilateral     TOE TENDON REPAIR      TUBAL ABDOMINAL LIGATION  2006    WISDOM TOOTH EXTRACTION         Family History:   Family History   Problem Relation Age of Onset    Arthritis Mother     Diabetes Mother     Heart disease Mother     Hypertension Mother     Stroke Mother      Alzheimer's disease Mother     Arthritis Father     Hypertension Father     Kidney disease Father     Lung disease Father         BLACK LUNG    Asthma Sister     Diabetes Sister     Hypertension Sister     Diabetes Brother     Hypertension Brother     Kidney disease Brother     Gout Brother     Diabetes Maternal Grandmother     Diabetes Paternal Grandmother     Breast cancer Paternal Cousin     Ovarian cancer Neg Hx     Uterine cancer Neg Hx     Colon cancer Neg Hx        Social History:   Social History     Socioeconomic History    Marital status: Single   Tobacco Use    Smoking status: Every Day     Current packs/day: 0.50     Average packs/day: 0.5 packs/day for 30.0 years (15.0 ttl pk-yrs)     Types: Cigarettes    Smokeless tobacco: Never    Tobacco comments:     30+ years, as of 09/09/2021   Vaping Use    Vaping status: Never Used   Substance and Sexual Activity    Alcohol use: Never    Drug use: Never    Sexual activity: Yes     Partners: Male     Birth control/protection: Tubal ligation       Medications:   Current Outpatient Medications:     Cholecalciferol 25 MCG (1000 UT) tablet, Take 1 tablet by mouth Daily. Patient takes 2, Disp: , Rfl:     citalopram (CeleXA) 20 MG tablet, , Disp: , Rfl:     KLOR-CON 20 MEQ CR tablet, , Disp: , Rfl:     latanoprost (XALATAN) 0.005 % ophthalmic solution, , Disp: , Rfl:     olmesartan-hydrochlorothiazide (BENICAR HCT) 40-12.5 MG per tablet, , Disp: , Rfl:     SUMAtriptan (IMITREX) 100 MG tablet, , Disp: , Rfl:     Xeljanz XR 11 MG tablet sustained-release 24 hour, Take 1 tablet by mouth Daily., Disp: 30 tablet, Rfl: 5    Dietary Management Product (RHEUMATE PO), Take 1 capsule by mouth., Disp: , Rfl:     Allergies:   Allergies   Allergen Reactions    Lisinopril Cough    Losartan Cough    Metronidazole Rash      rash       I have reviewed and updated the patient's chief complaint, history of present illness, review of systems, past medical history, surgical history, family  "history, social history, medications and allergy list as appropriate.     Objective    Vital Signs:   Vitals:    05/15/24 1150   BP: 128/80   BP Location: Left arm   Patient Position: Sitting   Cuff Size: Adult   Pulse: 66   Temp: 97.3 °F (36.3 °C)   TempSrc: Temporal   Weight: 79 kg (174 lb 1.6 oz)   Height: 165.1 cm (65\")     Body mass index is 28.97 kg/m².     Physical Exam:  Physical Exam  Vitals reviewed.   Constitutional:       Appearance: Normal appearance.   HENT:      Head: Normocephalic and atraumatic.      Mouth/Throat:      Mouth: Mucous membranes are moist.   Eyes:      Conjunctiva/sclera: Conjunctivae normal.   Cardiovascular:      Rate and Rhythm: Normal rate and regular rhythm.      Pulses: Normal pulses.   Pulmonary:      Effort: Pulmonary effort is normal.   Musculoskeletal:         General: Normal range of motion.      Cervical back: Normal range of motion and neck supple.   Skin:     General: Skin is warm and dry.   Neurological:      General: No focal deficit present.      Mental Status: She is alert and oriented to person, place, and time. Mental status is at baseline.   Psychiatric:         Mood and Affect: Mood normal.         Behavior: Behavior normal.         Thought Content: Thought content normal.         Judgment: Judgment normal.          Results Review:   Imaging Results (Last 24 Hours)       ** No results found for the last 24 hours. **            Procedures    Assessment / Plan    Assessment/Plan:   Diagnoses and all orders for this visit:    1. Rheumatoid arthritis involving multiple sites with positive rheumatoid factor (Primary)  Assessment & Plan:  On 5/26/2016 her folate was normal, B12 normal, CBC normal, uric acid 3.4, ASO normal, CRP 10.5 with less than 4.9 being normal, rheumatoid factor 18.7 with less than 13.9 being normal, CMP normal UA normal TSH low at 0.419 with a 0.450-4.5 being normal.    1.  She avoids NSAIDs due to concern about kidney disease.  2 she stopped " Humira on 5/27/2020 due to recurrent yeast infections.  3.  She stopped methotrexate 6/20/2021 as it had no effect on her symptoms.  4.  Her prognosis seems good.  5.  Follow-up in 3 to 4 months.  6.  Continue Xeljanz XR which is helpful.  CV risk warning has been previously discussed.  7.  Primate is helpful.  10.  Orencia from 6/20/2020 to 1/20/2021 and was stopped due to poor response.    Orders:  -     CBC With Manual Differential; Future  -     Comprehensive Metabolic Panel; Future  -     C-reactive Protein; Future  -     Sedimentation Rate; Future  -     Cancel: Hepatitis Panel, Acute; Future  -     Cancel: QuantiFERON TB Plus Client Incubated; Future  -     Xeljanz XR 11 MG tablet sustained-release 24 hour; Take 1 tablet by mouth Daily.  Dispense: 30 tablet; Refill: 5    2. Other fatigue  Assessment & Plan:  She had her hepatitis panel and QTB last week.    Orders:  -     Cancel: Hepatitis Panel, Acute; Future  -     Cancel: QuantiFERON TB Plus Client Incubated; Future    3. Poor sleep pattern  Assessment & Plan:  Difficulty falling asleep.  Melatonin was no help.  Historically was on amitriptyline but she also stopped this.      4. Immunodeficiency due to drug therapy  Assessment & Plan:  Xeljanz XR started January 2021.  Failed Humira, Orencia, methotrexate.    1. hold if the patient develops infection  2.  Avoid live vaccines on this medication.  3.  No recent serious infections.  4.  No injection site reactions.  5.  Also hold this medication perioperatively if the patient is going up surgical procedure.    Orders:  -     CBC With Manual Differential; Future  -     Comprehensive Metabolic Panel; Future  -     C-reactive Protein; Future  -     Sedimentation Rate; Future  -     Cancel: Hepatitis Panel, Acute; Future  -     Cancel: QuantiFERON TB Plus Client Incubated; Future  -     Xeljanz XR 11 MG tablet sustained-release 24 hour; Take 1 tablet by mouth Daily.  Dispense: 30 tablet; Refill: 5    5.  Recurrent infections  Assessment & Plan:  Treated for esophageal use for 21 days per GI in March 2020.  No recurrence but she has not had throat problems ever since.  In the fall 2023 she had endoscopy that show chronic cough is related to mucus.          Follow Up:   Return in about 4 months (around 9/15/2024) for Dr. Theodore.        ANNALEE Morris  [facility]

## 2024-05-28 ENCOUNTER — TRANSCRIBE ORDERS (OUTPATIENT)
Dept: ADMINISTRATIVE | Facility: HOSPITAL | Age: 64
End: 2024-05-28
Payer: COMMERCIAL

## 2024-05-28 DIAGNOSIS — Z12.31 VISIT FOR SCREENING MAMMOGRAM: Primary | ICD-10-CM

## 2024-06-27 ENCOUNTER — HOSPITAL ENCOUNTER (OUTPATIENT)
Dept: MAMMOGRAPHY | Facility: HOSPITAL | Age: 64
Discharge: HOME | End: 2024-06-27
Admitting: PHYSICIAN ASSISTANT
Payer: COMMERCIAL

## 2024-06-27 DIAGNOSIS — Z12.31 VISIT FOR SCREENING MAMMOGRAM: ICD-10-CM

## 2024-06-27 PROCEDURE — 77067 SCR MAMMO BI INCL CAD: CPT

## 2024-06-27 PROCEDURE — 77063 BREAST TOMOSYNTHESIS BI: CPT

## 2024-09-09 ENCOUNTER — SPECIALTY PHARMACY (OUTPATIENT)
Age: 64
End: 2024-09-09
Payer: COMMERCIAL

## 2024-09-17 ENCOUNTER — TELEPHONE (OUTPATIENT)
Age: 64
End: 2024-09-17
Payer: COMMERCIAL

## 2024-09-19 ENCOUNTER — OFFICE VISIT (OUTPATIENT)
Age: 64
End: 2024-09-19
Payer: COMMERCIAL

## 2024-09-19 VITALS
BODY MASS INDEX: 28.99 KG/M2 | HEART RATE: 72 BPM | SYSTOLIC BLOOD PRESSURE: 122 MMHG | TEMPERATURE: 97.7 F | WEIGHT: 174 LBS | DIASTOLIC BLOOD PRESSURE: 74 MMHG | HEIGHT: 65 IN

## 2024-09-19 DIAGNOSIS — R53.83 FATIGUE, UNSPECIFIED TYPE: ICD-10-CM

## 2024-09-19 DIAGNOSIS — M15.9 PRIMARY OSTEOARTHRITIS INVOLVING MULTIPLE JOINTS: Chronic | ICD-10-CM

## 2024-09-19 DIAGNOSIS — M05.9 SEROPOSITIVE RHEUMATOID ARTHRITIS: Primary | Chronic | ICD-10-CM

## 2024-09-19 DIAGNOSIS — Z79.899 HIGH RISK MEDICATION USE: Chronic | ICD-10-CM

## 2024-09-19 PROBLEM — M15.0 PRIMARY OSTEOARTHRITIS INVOLVING MULTIPLE JOINTS: Chronic | Status: ACTIVE | Noted: 2024-09-19

## 2024-09-19 PROCEDURE — 99214 OFFICE O/P EST MOD 30 MIN: CPT | Performed by: INTERNAL MEDICINE

## 2024-09-19 RX ORDER — CITALOPRAM HYDROBROMIDE 20 MG/1
1 TABLET ORAL DAILY
COMMUNITY
Start: 2024-07-17

## 2024-09-19 RX ORDER — FLUTICASONE PROPIONATE 50 MCG
SPRAY, SUSPENSION (ML) NASAL
COMMUNITY
Start: 2024-06-13

## 2024-09-19 RX ORDER — POTASSIUM CHLORIDE 1500 MG/1
20 TABLET, EXTENDED RELEASE ORAL DAILY
COMMUNITY
Start: 2024-06-26

## 2024-09-19 RX ORDER — OLMESARTAN MEDOXOMIL AND HYDROCHLOROTHIAZIDE 40/12.5 40; 12.5 MG/1; MG/1
1 TABLET ORAL DAILY
COMMUNITY
Start: 2024-07-05

## 2024-09-19 RX ORDER — TOFACITINIB 11 MG/1
11 TABLET, FILM COATED, EXTENDED RELEASE ORAL DAILY
Qty: 90 TABLET | Refills: 1 | Status: SHIPPED | OUTPATIENT
Start: 2024-09-19

## 2024-09-26 ENCOUNTER — OFFICE VISIT (OUTPATIENT)
Dept: OBSTETRICS AND GYNECOLOGY | Facility: CLINIC | Age: 64
End: 2024-09-26
Payer: COMMERCIAL

## 2024-09-26 VITALS
WEIGHT: 173 LBS | BODY MASS INDEX: 28.82 KG/M2 | DIASTOLIC BLOOD PRESSURE: 70 MMHG | SYSTOLIC BLOOD PRESSURE: 120 MMHG | HEIGHT: 65 IN

## 2024-09-26 DIAGNOSIS — Z79.899 IMMUNODEFICIENCY DUE TO DRUG THERAPY: Chronic | ICD-10-CM

## 2024-09-26 DIAGNOSIS — D84.821 IMMUNODEFICIENCY DUE TO DRUG THERAPY: Chronic | ICD-10-CM

## 2024-09-26 DIAGNOSIS — R87.610 ASCUS WITH POSITIVE HIGH RISK HPV CERVICAL: ICD-10-CM

## 2024-09-26 DIAGNOSIS — Z01.419 PAP TEST, AS PART OF ROUTINE GYNECOLOGICAL EXAMINATION: ICD-10-CM

## 2024-09-26 DIAGNOSIS — Z12.31 BREAST CANCER SCREENING BY MAMMOGRAM: ICD-10-CM

## 2024-09-26 DIAGNOSIS — Z01.419 ENCOUNTER FOR GYNECOLOGICAL EXAMINATION WITHOUT ABNORMAL FINDING: Primary | ICD-10-CM

## 2024-09-26 DIAGNOSIS — Z87.39 HISTORY OF OSTEOPENIA: ICD-10-CM

## 2024-09-26 DIAGNOSIS — R87.810 ASCUS WITH POSITIVE HIGH RISK HPV CERVICAL: ICD-10-CM

## 2024-10-04 LAB — REF LAB TEST METHOD: NORMAL

## 2024-12-23 ENCOUNTER — TELEPHONE (OUTPATIENT)
Age: 64
End: 2024-12-23
Payer: COMMERCIAL

## 2025-01-16 NOTE — ASSESSMENT & PLAN NOTE
Xeljanz XR started January 2021.  Failed Humira, Orencia, methotrexate.    1. hold if the patient develops infection  2.  Avoid live vaccines on this medication.  3.  No recent serious infections.  4.  No injection site reactions.  5.  Also hold this medication perioperatively if the patient is going up surgical procedure.

## 2025-01-16 NOTE — ASSESSMENT & PLAN NOTE
Tylenol PRN is ok as directed  She avoids oral NSAIDS due to concerns about kidney disease  She has tried taking supplements like glucosamine   She has seen a chiropractor

## 2025-01-16 NOTE — ASSESSMENT & PLAN NOTE
Xeljanz XR 11 mg PO once/day for RA   1. Hold if the patient develops infection.   2. Avoid live vaccines while on this medication.   3. No recent serious infections  4. Also hold this medication perioperatively if the patient is going to have a surgical procedure

## 2025-01-16 NOTE — ASSESSMENT & PLAN NOTE
* At presentation to [facility] on 6/29/2016: Joint pain/weakness for 3 months. Glucosamine/chondroitin not helping. She avoids NSAIDS due to concern about kidney disease.  * 5/26/2016: Folate normal, B12 normal, CBC normal, uric acid 3.4, ASO normal, CRP 10.5 (< 4.9), RF 18.7 (< 13.9), CMP normal, UA normal, TSH low 0.419 (0.450-4.5)  1. She avoids NSAIDS due to concern about kidney disease.  2. Stopped Humira 5/27/2020 due to recurrent yeast infections  3. She stopped MTX 6/2021 with no change in joint s/s.   4.  Prognosis seems good.   5. Follow up in 3-4 months.   6. Refill medication.   7. Check labs prior to next visit.   8.  Continue Xeljanz XR which is helpful. Refill today   9. She has taken/tried Rheumate   10. Orencia started 6/2020. Stopped 1/2021 due to poor response.   11. She has been diagnosed with emphysema.

## 2025-01-21 ENCOUNTER — OFFICE VISIT (OUTPATIENT)
Age: 65
End: 2025-01-21
Payer: COMMERCIAL

## 2025-01-21 VITALS
SYSTOLIC BLOOD PRESSURE: 128 MMHG | TEMPERATURE: 97.2 F | BODY MASS INDEX: 28.82 KG/M2 | DIASTOLIC BLOOD PRESSURE: 76 MMHG | HEART RATE: 69 BPM | HEIGHT: 65 IN | WEIGHT: 173 LBS

## 2025-01-21 DIAGNOSIS — M05.9 SEROPOSITIVE RHEUMATOID ARTHRITIS: Primary | ICD-10-CM

## 2025-01-21 DIAGNOSIS — Z13.220 NEED FOR LIPID SCREENING: ICD-10-CM

## 2025-01-21 DIAGNOSIS — R53.83 OTHER FATIGUE: ICD-10-CM

## 2025-01-21 DIAGNOSIS — M15.0 PRIMARY OSTEOARTHRITIS INVOLVING MULTIPLE JOINTS: Chronic | ICD-10-CM

## 2025-01-21 DIAGNOSIS — Z79.899 IMMUNODEFICIENCY DUE TO DRUG THERAPY: Chronic | ICD-10-CM

## 2025-01-21 DIAGNOSIS — D84.821 IMMUNODEFICIENCY DUE TO DRUG THERAPY: Chronic | ICD-10-CM

## 2025-01-21 PROCEDURE — 99214 OFFICE O/P EST MOD 30 MIN: CPT | Performed by: NURSE PRACTITIONER

## 2025-01-21 RX ORDER — FLUTICASONE PROPIONATE AND SALMETEROL 250; 50 UG/1; UG/1
1 POWDER RESPIRATORY (INHALATION)
COMMUNITY
Start: 2024-12-03 | End: 2025-01-21

## 2025-01-21 RX ORDER — PANTOPRAZOLE SODIUM 40 MG/1
TABLET, DELAYED RELEASE ORAL
COMMUNITY
Start: 2024-10-01

## 2025-01-21 RX ORDER — AZELASTINE 1 MG/ML
SPRAY, METERED NASAL
COMMUNITY
Start: 2024-10-01

## 2025-01-21 RX ORDER — VARENICLINE TARTRATE 0.5 (11)-1
KIT ORAL
COMMUNITY
Start: 2024-12-20

## 2025-01-21 RX ORDER — MONTELUKAST SODIUM 10 MG/1
1 TABLET ORAL NIGHTLY
COMMUNITY
Start: 2024-11-26

## 2025-01-21 RX ORDER — CEFDINIR 300 MG/1
CAPSULE ORAL
COMMUNITY
Start: 2024-12-19 | End: 2025-01-21

## 2025-01-21 NOTE — PROGRESS NOTES
Office Visit       Date: 01/21/2025   Patient Name: Jessika Mustafa  MRN: [id number]  YOB: 1960    Referring Physician: No ref. provider found     Chief Complaint:   Chief Complaint   Patient presents with    Rheumatoid Arthritis       History of Present Illness: Jessika Mustafa is a 64 y.o. female who is here today for follow-up of rheumatoid arthritis.  Patient reports feeling the same.  She rates her pain 0 out of 10 and has no morning stiffness.  She has been started on pantoprazole and montelukast as well as azelastine since we last saw her.  She has been diagnosed with emphysema.  Breathing test tomorrow.  She will see pulmonology in one week.  She is on Chantix.    Subjective   Review of Systems:  Positive for postnasal drip, cough, shortness of breath and back pain.  All other review of symptoms are negative.    Past Medical History:   Past Medical History:   Diagnosis Date    Anemia DK    Arthritis, rheumatoid     High risk medication use 09/19/2024    History of abnormal cervical Papanicolaou smear     HPV + non 16/18    HPV (human papilloma virus) infection DK    Hyperlipidemia 2022    Hypertension     Migraine headache     Osteopenia     Rheumatoid arthritis        Past Surgical History:   Past Surgical History:   Procedure Laterality Date    COLONOSCOPY  05/01/2017    benign polyps; repeat in 5 years    COLPOSCOPY W/ BIOPSY / CURETTAGE      10/15/2020, 9/19/2019, 09/06/2018    KNEE CARTILAGE SURGERY      KNEE SURGERY Bilateral     TOE TENDON REPAIR      TUBAL ABDOMINAL LIGATION  2006    WISDOM TOOTH EXTRACTION         Family History:   Family History   Problem Relation Age of Onset    Arthritis Mother     Diabetes Mother     Heart disease Mother     Hypertension Mother     Stroke Mother     Alzheimer's disease Mother     Arthritis Father     Hypertension Father     Kidney disease Father     Lung disease Father         BLACK LUNG    Asthma Sister     Diabetes Sister      Hypertension Sister     Diabetes Brother     Hypertension Brother     Kidney disease Brother     Gout Brother     Diabetes Maternal Grandmother     Diabetes Paternal Grandmother     Breast cancer Paternal Cousin     Prostate cancer Brother     Ovarian cancer Neg Hx     Uterine cancer Neg Hx     Colon cancer Neg Hx        Social History:   Social History     Socioeconomic History    Marital status: Single   Tobacco Use    Smoking status: Every Day     Current packs/day: 0.50     Average packs/day: 0.5 packs/day for 30.0 years (15.0 ttl pk-yrs)     Types: Cigarettes     Passive exposure: Past    Smokeless tobacco: Never    Tobacco comments:     30+ years, as of 09/09/2021   Vaping Use    Vaping status: Never Used   Substance and Sexual Activity    Alcohol use: Never    Drug use: Never    Sexual activity: Yes     Partners: Male     Birth control/protection: Tubal ligation       Medications:   Current Outpatient Medications:     azelastine (ASTELIN) 0.1 % nasal spray, SMARTSIG:Both Nares, Disp: , Rfl:     Cholecalciferol 25 MCG (1000 UT) tablet, Take 1 tablet by mouth Daily. Patient takes 2, Disp: , Rfl:     citalopram (CeleXA) 20 MG tablet, Take 1 tablet by mouth Daily., Disp: , Rfl:     fluticasone (FLONASE) 50 MCG/ACT nasal spray, , Disp: , Rfl:     KLOR-CON 20 MEQ CR tablet, , Disp: , Rfl:     latanoprost (XALATAN) 0.005 % ophthalmic solution, , Disp: , Rfl:     montelukast (SINGULAIR) 10 MG tablet, Take 1 tablet by mouth Every Night., Disp: , Rfl:     olmesartan-hydrochlorothiazide (BENICAR HCT) 40-12.5 MG per tablet, Take 1 tablet by mouth Daily., Disp: , Rfl:     pantoprazole (PROTONIX) 40 MG EC tablet, TAKE 1 TABLET BY MOUTH DAILY BEFORE BREAKFAST. DO NOT CRUSH, CHEW, OR SPLIT, Disp: , Rfl:     SUMAtriptan (IMITREX) 100 MG tablet, , Disp: , Rfl:     Varenicline Tartrate, Starter, 0.5 MG X 11 & 1 MG X 42 tablet therapy pack, TAKE 1 (0.5MG) TABLET BY MOUTH DAILY FOR 3 DAYS, THEN TAKE 1 (0.5MG) TABLET TWICE DAILY  "FOR 4 DAYS, THEN TAKE 1 (1MG) TABLET TWICE DAILY THEREAFTER. TAKE WITH MEALS AND A FULL GLASS OF WATER.., Disp: , Rfl:     Xeljanz XR 11 MG tablet sustained-release 24 hour, Take 1 tablet by mouth Daily., Disp: 90 tablet, Rfl: 1    Allergies:   Allergies   Allergen Reactions    Lisinopril Cough    Losartan Cough    Metronidazole Rash      rash       I have reviewed and updated the patient's chief complaint, history of present illness, review of systems, past medical history, surgical history, family history, social history, medications and allergy list as appropriate.     Objective    Vital Signs:   Vitals:    01/21/25 1349   BP: 128/76   BP Location: Left arm   Patient Position: Sitting   Cuff Size: Adult   Pulse: 69   Temp: 97.2 °F (36.2 °C)   Weight: 78.5 kg (173 lb)   Height: 165.1 cm (65\")   PainSc: 0-No pain     Body mass index is 28.79 kg/m².   Defer to PCP       Physical Exam:  Physical Exam  Vitals reviewed.   Constitutional:       Appearance: Normal appearance.   HENT:      Head: Normocephalic and atraumatic.      Mouth/Throat:      Mouth: Mucous membranes are moist.   Eyes:      Conjunctiva/sclera: Conjunctivae normal.   Cardiovascular:      Rate and Rhythm: Normal rate and regular rhythm.      Pulses: Normal pulses.      Heart sounds: Normal heart sounds.   Pulmonary:      Effort: Pulmonary effort is normal.      Breath sounds: Normal breath sounds.   Musculoskeletal:         General: Normal range of motion.      Cervical back: Normal range of motion and neck supple.      Comments: OA bilateral fingers  No synovitis or soft tissue swelling  No RA deformities   Skin:     General: Skin is warm and dry.   Neurological:      General: No focal deficit present.      Mental Status: She is alert and oriented to person, place, and time. Mental status is at baseline.   Psychiatric:         Mood and Affect: Mood normal.         Behavior: Behavior normal.         Thought Content: Thought content normal.         " Judgment: Judgment normal.          Results Review:   Imaging Results (Last 24 Hours)       ** No results found for the last 24 hours. **            Procedures    Assessment / Plan    Assessment/Plan:   Diagnoses and all orders for this visit:    1. Seropositive rheumatoid arthritis (Primary)  Assessment & Plan:  * At presentation to [facility] on 6/29/2016: Joint pain/weakness for 3 months. Glucosamine/chondroitin not helping. She avoids NSAIDS due to concern about kidney disease.  * 5/26/2016: Folate normal, B12 normal, CBC normal, uric acid 3.4, ASO normal, CRP 10.5 (< 4.9), RF 18.7 (< 13.9), CMP normal, UA normal, TSH low 0.419 (0.450-4.5)  1. She avoids NSAIDS due to concern about kidney disease.  2. Stopped Humira 5/27/2020 due to recurrent yeast infections  3. She stopped MTX 6/2021 with no change in joint s/s.   4.  Prognosis seems good.   5. Follow up in 3-4 months.   6. Refill medication.   7. Check labs prior to next visit.   8.  Continue Xeljanz XR which is helpful. Refill today   9. She has taken/tried Rheumate   10. Orencia started 6/2020. Stopped 1/2021 due to poor response.   11. She has been diagnosed with emphysema.      Orders:  -     QuantiFERON-TB Gold Plus; Future  -     Hepatitis Panel, Acute; Future  -     CBC With Manual Differential; Future  -     Comprehensive Metabolic Panel; Future  -     C-reactive Protein; Future  -     Sedimentation Rate; Future    2. Primary osteoarthritis involving multiple joints  Assessment & Plan:  Tylenol PRN is ok as directed  She avoids oral NSAIDS due to concerns about kidney disease  She has tried taking supplements like glucosamine   She has seen a chiropractor       3. Need for lipid screening  Assessment & Plan:  Check lipids every 6 months as Xeljanz has been shown to increase cholesterol.      4. Immunodeficiency due to drug therapy  Assessment & Plan:  Xeljanz XR started January 2021.  Failed Humira, Orencia, methotrexate.    1. hold if the patient develops  infection  2.  Avoid live vaccines on this medication.  3.  No recent serious infections.  4.  No injection site reactions.  5.  Also hold this medication perioperatively if the patient is going up surgical procedure.    Orders:  -     QuantiFERON-TB Gold Plus; Future  -     Hepatitis Panel, Acute; Future  -     CBC With Manual Differential; Future  -     Comprehensive Metabolic Panel; Future  -     C-reactive Protein; Future  -     Sedimentation Rate; Future    5. Other fatigue  Assessment & Plan:  Update TB and hepatitis tests     Orders:  -     QuantiFERON-TB Gold Plus; Future  -     Hepatitis Panel, Acute; Future        Follow Up:   Return in about 4 months (around 5/21/2025) for ANNALEE Cooper, Dr. Theodore.        ANNALEE Morris  [facility] [place]

## 2025-02-10 ENCOUNTER — APPOINTMENT (OUTPATIENT)
Dept: GENERAL RADIOLOGY | Facility: HOSPITAL | Age: 65
End: 2025-02-10
Payer: COMMERCIAL

## 2025-02-10 ENCOUNTER — APPOINTMENT (OUTPATIENT)
Dept: CT IMAGING | Facility: HOSPITAL | Age: 65
End: 2025-02-10
Payer: COMMERCIAL

## 2025-02-10 ENCOUNTER — HOSPITAL ENCOUNTER (OUTPATIENT)
Facility: HOSPITAL | Age: 65
Setting detail: OBSERVATION
Discharge: HOME | End: 2025-02-12
Attending: EMERGENCY MEDICINE | Admitting: INTERNAL MEDICINE
Payer: COMMERCIAL

## 2025-02-10 DIAGNOSIS — G47.8 POOR SLEEP PATTERN: Chronic | ICD-10-CM

## 2025-02-10 DIAGNOSIS — Z13.220 NEED FOR LIPID SCREENING: ICD-10-CM

## 2025-02-10 DIAGNOSIS — M05.9 SEROPOSITIVE RHEUMATOID ARTHRITIS: ICD-10-CM

## 2025-02-10 DIAGNOSIS — B99.9 RECURRENT INFECTIONS: Chronic | ICD-10-CM

## 2025-02-10 DIAGNOSIS — Z79.899 HIGH RISK MEDICATION USE: Chronic | ICD-10-CM

## 2025-02-10 DIAGNOSIS — J44.1 CHRONIC OBSTRUCTIVE PULMONARY DISEASE WITH ACUTE EXACERBATION: ICD-10-CM

## 2025-02-10 DIAGNOSIS — J44.1 COPD EXACERBATION: ICD-10-CM

## 2025-02-10 DIAGNOSIS — D84.821 IMMUNODEFICIENCY DUE TO DRUG THERAPY: Chronic | ICD-10-CM

## 2025-02-10 DIAGNOSIS — Z79.899 IMMUNODEFICIENCY DUE TO DRUG THERAPY: Chronic | ICD-10-CM

## 2025-02-10 DIAGNOSIS — J96.21 ACUTE ON CHRONIC RESPIRATORY FAILURE WITH HYPOXIA: ICD-10-CM

## 2025-02-10 DIAGNOSIS — J10.1 INFLUENZA A: Primary | ICD-10-CM

## 2025-02-10 DIAGNOSIS — Z87.39 HISTORY OF OSTEOPENIA: ICD-10-CM

## 2025-02-10 DIAGNOSIS — Z86.19 HISTORY OF HPV INFECTION: ICD-10-CM

## 2025-02-10 DIAGNOSIS — J44.1 COPD WITH ACUTE EXACERBATION: ICD-10-CM

## 2025-02-10 PROBLEM — I10 HTN (HYPERTENSION): Chronic | Status: ACTIVE | Noted: 2025-02-10

## 2025-02-10 LAB
ALBUMIN SERPL-MCNC: 4.5 G/DL (ref 3.5–5.2)
ALBUMIN/GLOB SERPL: 1.3 G/DL
ALP SERPL-CCNC: 54 U/L (ref 39–117)
ALT SERPL W P-5'-P-CCNC: 19 U/L (ref 1–33)
ANION GAP SERPL CALCULATED.3IONS-SCNC: 11 MMOL/L (ref 5–15)
AST SERPL-CCNC: 22 U/L (ref 1–32)
BASOPHILS # BLD MANUAL: 0 10*3/MM3 (ref 0–0.2)
BASOPHILS NFR BLD MANUAL: 0 % (ref 0–1.5)
BILIRUB SERPL-MCNC: 0.7 MG/DL (ref 0–1.2)
BUN SERPL-MCNC: 17 MG/DL (ref 8–23)
BUN/CREAT SERPL: 18.7 (ref 7–25)
CALCIUM SPEC-SCNC: 10.2 MG/DL (ref 8.6–10.5)
CHLORIDE SERPL-SCNC: 100 MMOL/L (ref 98–107)
CO2 SERPL-SCNC: 30 MMOL/L (ref 22–29)
CREAT SERPL-MCNC: 0.91 MG/DL (ref 0.57–1)
DEPRECATED RDW RBC AUTO: 44.4 FL (ref 37–54)
EGFRCR SERPLBLD CKD-EPI 2021: 71.5 ML/MIN/1.73
EOSINOPHIL # BLD MANUAL: 0 10*3/MM3 (ref 0–0.4)
EOSINOPHIL NFR BLD MANUAL: 0 % (ref 0.3–6.2)
ERYTHROCYTE [DISTWIDTH] IN BLOOD BY AUTOMATED COUNT: 13.7 % (ref 12.3–15.4)
FLUAV RNA RESP QL NAA+PROBE: DETECTED
FLUBV RNA RESP QL NAA+PROBE: NOT DETECTED
GEN 5 1HR TROPONIN T REFLEX: 9 NG/L
GLOBULIN UR ELPH-MCNC: 3.6 GM/DL
GLUCOSE SERPL-MCNC: 93 MG/DL (ref 65–99)
HCT VFR BLD AUTO: 42.2 % (ref 34–46.6)
HGB BLD-MCNC: 14.1 G/DL (ref 12–15.9)
HOLD SPECIMEN: NORMAL
LYMPHOCYTES # BLD MANUAL: 1.64 10*3/MM3 (ref 0.7–3.1)
LYMPHOCYTES NFR BLD MANUAL: 9 % (ref 5–12)
MCH RBC QN AUTO: 29.6 PG (ref 26.6–33)
MCHC RBC AUTO-ENTMCNC: 33.4 G/DL (ref 31.5–35.7)
MCV RBC AUTO: 88.7 FL (ref 79–97)
MONOCYTES # BLD: 0.61 10*3/MM3 (ref 0.1–0.9)
NEUTROPHILS # BLD AUTO: 4.57 10*3/MM3 (ref 1.7–7)
NEUTROPHILS NFR BLD MANUAL: 65 % (ref 42.7–76)
NEUTS BAND NFR BLD MANUAL: 2 % (ref 0–5)
NRBC SPEC MANUAL: 0 /100 WBC (ref 0–0.2)
NT-PROBNP SERPL-MCNC: 57.2 PG/ML (ref 0–900)
PLAT MORPH BLD: NORMAL
PLATELET # BLD AUTO: 243 10*3/MM3 (ref 140–450)
PMV BLD AUTO: 9.9 FL (ref 6–12)
POTASSIUM SERPL-SCNC: 3.6 MMOL/L (ref 3.5–5.2)
PROT SERPL-MCNC: 8.1 G/DL (ref 6–8.5)
RBC # BLD AUTO: 4.76 10*6/MM3 (ref 3.77–5.28)
RBC MORPH BLD: NORMAL
SARS-COV-2 RNA RESP QL NAA+PROBE: NOT DETECTED
SODIUM SERPL-SCNC: 141 MMOL/L (ref 136–145)
TROPONIN T NUMERIC DELTA: -1 NG/L
TROPONIN T SERPL HS-MCNC: 10 NG/L
VARIANT LYMPHS NFR BLD MANUAL: 21 % (ref 19.6–45.3)
VARIANT LYMPHS NFR BLD MANUAL: 3 % (ref 0–5)
WBC MORPH BLD: NORMAL
WBC NRBC COR # BLD AUTO: 6.82 10*3/MM3 (ref 3.4–10.8)
WHOLE BLOOD HOLD COAG: NORMAL
WHOLE BLOOD HOLD SPECIMEN: NORMAL

## 2025-02-10 PROCEDURE — 71045 X-RAY EXAM CHEST 1 VIEW: CPT

## 2025-02-10 PROCEDURE — 99223 1ST HOSP IP/OBS HIGH 75: CPT | Performed by: NURSE PRACTITIONER

## 2025-02-10 PROCEDURE — 80053 COMPREHEN METABOLIC PANEL: CPT | Performed by: EMERGENCY MEDICINE

## 2025-02-10 PROCEDURE — 96365 THER/PROPH/DIAG IV INF INIT: CPT

## 2025-02-10 PROCEDURE — 85025 COMPLETE CBC W/AUTO DIFF WBC: CPT | Performed by: EMERGENCY MEDICINE

## 2025-02-10 PROCEDURE — 94640 AIRWAY INHALATION TREATMENT: CPT

## 2025-02-10 PROCEDURE — 87636 SARSCOV2 & INF A&B AMP PRB: CPT | Performed by: EMERGENCY MEDICINE

## 2025-02-10 PROCEDURE — 25510000001 IOPAMIDOL PER 1 ML: Performed by: EMERGENCY MEDICINE

## 2025-02-10 PROCEDURE — 84484 ASSAY OF TROPONIN QUANT: CPT | Performed by: EMERGENCY MEDICINE

## 2025-02-10 PROCEDURE — G0378 HOSPITAL OBSERVATION PER HR: HCPCS

## 2025-02-10 PROCEDURE — 93005 ELECTROCARDIOGRAM TRACING: CPT

## 2025-02-10 PROCEDURE — 96375 TX/PRO/DX INJ NEW DRUG ADDON: CPT

## 2025-02-10 PROCEDURE — 71275 CT ANGIOGRAPHY CHEST: CPT

## 2025-02-10 PROCEDURE — 85007 BL SMEAR W/DIFF WBC COUNT: CPT | Performed by: EMERGENCY MEDICINE

## 2025-02-10 PROCEDURE — 93005 ELECTROCARDIOGRAM TRACING: CPT | Performed by: EMERGENCY MEDICINE

## 2025-02-10 PROCEDURE — 94799 UNLISTED PULMONARY SVC/PX: CPT

## 2025-02-10 PROCEDURE — 99285 EMERGENCY DEPT VISIT HI MDM: CPT

## 2025-02-10 PROCEDURE — 36415 COLL VENOUS BLD VENIPUNCTURE: CPT

## 2025-02-10 PROCEDURE — 25010000002 PIPERACILLIN SOD-TAZOBACTAM PER 1 G: Performed by: NURSE PRACTITIONER

## 2025-02-10 PROCEDURE — 83880 ASSAY OF NATRIURETIC PEPTIDE: CPT | Performed by: EMERGENCY MEDICINE

## 2025-02-10 PROCEDURE — 25010000002 METHYLPREDNISOLONE PER 125 MG: Performed by: EMERGENCY MEDICINE

## 2025-02-10 RX ORDER — AMOXICILLIN 250 MG
2 CAPSULE ORAL 2 TIMES DAILY PRN
Status: DISCONTINUED | OUTPATIENT
Start: 2025-02-10 | End: 2025-02-12 | Stop reason: HOSPADM

## 2025-02-10 RX ORDER — BISACODYL 5 MG/1
5 TABLET, DELAYED RELEASE ORAL DAILY PRN
Status: DISCONTINUED | OUTPATIENT
Start: 2025-02-10 | End: 2025-02-12 | Stop reason: HOSPADM

## 2025-02-10 RX ORDER — IBUPROFEN 400 MG/1
200 TABLET, FILM COATED ORAL EVERY 4 HOURS PRN
Status: DISCONTINUED | OUTPATIENT
Start: 2025-02-10 | End: 2025-02-12 | Stop reason: HOSPADM

## 2025-02-10 RX ORDER — AZELASTINE 1 MG/ML
1 SPRAY, METERED NASAL DAILY
Status: DISCONTINUED | OUTPATIENT
Start: 2025-02-11 | End: 2025-02-12 | Stop reason: HOSPADM

## 2025-02-10 RX ORDER — DOXYCYCLINE 100 MG/1
100 CAPSULE ORAL EVERY 12 HOURS SCHEDULED
Status: DISCONTINUED | OUTPATIENT
Start: 2025-02-10 | End: 2025-02-12 | Stop reason: HOSPADM

## 2025-02-10 RX ORDER — SODIUM CHLORIDE 0.9 % (FLUSH) 0.9 %
10 SYRINGE (ML) INJECTION AS NEEDED
Status: DISCONTINUED | OUTPATIENT
Start: 2025-02-10 | End: 2025-02-12 | Stop reason: HOSPADM

## 2025-02-10 RX ORDER — IOPAMIDOL 755 MG/ML
70 INJECTION, SOLUTION INTRAVASCULAR
Status: COMPLETED | OUTPATIENT
Start: 2025-02-10 | End: 2025-02-10

## 2025-02-10 RX ORDER — IPRATROPIUM BROMIDE AND ALBUTEROL SULFATE 2.5; .5 MG/3ML; MG/3ML
3 SOLUTION RESPIRATORY (INHALATION)
Status: DISCONTINUED | OUTPATIENT
Start: 2025-02-10 | End: 2025-02-12 | Stop reason: HOSPADM

## 2025-02-10 RX ORDER — SODIUM CHLORIDE 0.9 % (FLUSH) 0.9 %
10 SYRINGE (ML) INJECTION EVERY 12 HOURS SCHEDULED
Status: DISCONTINUED | OUTPATIENT
Start: 2025-02-10 | End: 2025-02-12 | Stop reason: HOSPADM

## 2025-02-10 RX ORDER — FLUTICASONE PROPIONATE 50 MCG
1 SPRAY, SUSPENSION (ML) NASAL DAILY
Status: DISCONTINUED | OUTPATIENT
Start: 2025-02-11 | End: 2025-02-12 | Stop reason: HOSPADM

## 2025-02-10 RX ORDER — BISACODYL 10 MG
10 SUPPOSITORY, RECTAL RECTAL DAILY PRN
Status: DISCONTINUED | OUTPATIENT
Start: 2025-02-10 | End: 2025-02-12 | Stop reason: HOSPADM

## 2025-02-10 RX ORDER — POLYETHYLENE GLYCOL 3350 17 G/17G
17 POWDER, FOR SOLUTION ORAL DAILY PRN
Status: DISCONTINUED | OUTPATIENT
Start: 2025-02-10 | End: 2025-02-12 | Stop reason: HOSPADM

## 2025-02-10 RX ORDER — CITALOPRAM HYDROBROMIDE 20 MG/1
20 TABLET ORAL NIGHTLY
Status: DISCONTINUED | OUTPATIENT
Start: 2025-02-10 | End: 2025-02-12 | Stop reason: HOSPADM

## 2025-02-10 RX ORDER — IPRATROPIUM BROMIDE AND ALBUTEROL SULFATE 2.5; .5 MG/3ML; MG/3ML
3 SOLUTION RESPIRATORY (INHALATION) EVERY 6 HOURS PRN
Status: DISCONTINUED | OUTPATIENT
Start: 2025-02-10 | End: 2025-02-12 | Stop reason: HOSPADM

## 2025-02-10 RX ORDER — GUAIFENESIN 600 MG/1
1200 TABLET, EXTENDED RELEASE ORAL EVERY 12 HOURS SCHEDULED
Status: DISCONTINUED | OUTPATIENT
Start: 2025-02-10 | End: 2025-02-12 | Stop reason: HOSPADM

## 2025-02-10 RX ORDER — AMLODIPINE BESYLATE 5 MG/1
5 TABLET ORAL
Status: DISCONTINUED | OUTPATIENT
Start: 2025-02-11 | End: 2025-02-12 | Stop reason: HOSPADM

## 2025-02-10 RX ORDER — NITROGLYCERIN 0.4 MG/1
0.4 TABLET SUBLINGUAL
Status: DISCONTINUED | OUTPATIENT
Start: 2025-02-10 | End: 2025-02-12 | Stop reason: HOSPADM

## 2025-02-10 RX ORDER — POTASSIUM CHLORIDE 1500 MG/1
30 TABLET, EXTENDED RELEASE ORAL DAILY
Status: DISCONTINUED | OUTPATIENT
Start: 2025-02-11 | End: 2025-02-12 | Stop reason: HOSPADM

## 2025-02-10 RX ORDER — PANTOPRAZOLE SODIUM 40 MG/1
40 TABLET, DELAYED RELEASE ORAL NIGHTLY
Status: DISCONTINUED | OUTPATIENT
Start: 2025-02-10 | End: 2025-02-12 | Stop reason: HOSPADM

## 2025-02-10 RX ORDER — PREDNISONE 20 MG/1
40 TABLET ORAL
Status: DISCONTINUED | OUTPATIENT
Start: 2025-02-11 | End: 2025-02-12 | Stop reason: HOSPADM

## 2025-02-10 RX ORDER — IPRATROPIUM BROMIDE AND ALBUTEROL SULFATE 2.5; .5 MG/3ML; MG/3ML
3 SOLUTION RESPIRATORY (INHALATION) ONCE
Status: COMPLETED | OUTPATIENT
Start: 2025-02-10 | End: 2025-02-10

## 2025-02-10 RX ORDER — LATANOPROST 50 UG/ML
1 SOLUTION/ DROPS OPHTHALMIC DAILY
Status: DISCONTINUED | OUTPATIENT
Start: 2025-02-10 | End: 2025-02-12 | Stop reason: HOSPADM

## 2025-02-10 RX ORDER — PANTOPRAZOLE SODIUM 40 MG/1
40 TABLET, DELAYED RELEASE ORAL
Status: DISCONTINUED | OUTPATIENT
Start: 2025-02-11 | End: 2025-02-10

## 2025-02-10 RX ORDER — SODIUM CHLORIDE 9 MG/ML
40 INJECTION, SOLUTION INTRAVENOUS AS NEEDED
Status: DISCONTINUED | OUTPATIENT
Start: 2025-02-10 | End: 2025-02-12 | Stop reason: HOSPADM

## 2025-02-10 RX ORDER — CITALOPRAM HYDROBROMIDE 20 MG/1
20 TABLET ORAL DAILY
Status: DISCONTINUED | OUTPATIENT
Start: 2025-02-11 | End: 2025-02-10

## 2025-02-10 RX ORDER — BENZONATATE 100 MG/1
100 CAPSULE ORAL 3 TIMES DAILY PRN
Status: DISCONTINUED | OUTPATIENT
Start: 2025-02-10 | End: 2025-02-12 | Stop reason: HOSPADM

## 2025-02-10 RX ORDER — METHYLPREDNISOLONE SODIUM SUCCINATE 125 MG/2ML
125 INJECTION, POWDER, LYOPHILIZED, FOR SOLUTION INTRAMUSCULAR; INTRAVENOUS ONCE
Status: COMPLETED | OUTPATIENT
Start: 2025-02-10 | End: 2025-02-10

## 2025-02-10 RX ORDER — MONTELUKAST SODIUM 10 MG/1
10 TABLET ORAL NIGHTLY
Status: DISCONTINUED | OUTPATIENT
Start: 2025-02-10 | End: 2025-02-12 | Stop reason: HOSPADM

## 2025-02-10 RX ADMIN — BENZONATATE 100 MG: 100 CAPSULE ORAL at 22:48

## 2025-02-10 RX ADMIN — IOPAMIDOL 70 ML: 755 INJECTION, SOLUTION INTRAVENOUS at 14:19

## 2025-02-10 RX ADMIN — GUAIFENESIN 1200 MG: 600 TABLET ORAL at 22:48

## 2025-02-10 RX ADMIN — CITALOPRAM HYDROBROMIDE 20 MG: 20 TABLET ORAL at 22:49

## 2025-02-10 RX ADMIN — PANTOPRAZOLE SODIUM 40 MG: 40 TABLET, DELAYED RELEASE ORAL at 22:49

## 2025-02-10 RX ADMIN — METHYLPREDNISOLONE SODIUM SUCCINATE 125 MG: 125 INJECTION INTRAMUSCULAR; INTRAVENOUS at 15:31

## 2025-02-10 RX ADMIN — IPRATROPIUM BROMIDE AND ALBUTEROL SULFATE 3 ML: 2.5; .5 SOLUTION RESPIRATORY (INHALATION) at 16:11

## 2025-02-10 RX ADMIN — PIPERACILLIN AND TAZOBACTAM 3.38 G: 3; .375 INJECTION, POWDER, LYOPHILIZED, FOR SOLUTION INTRAVENOUS at 22:49

## 2025-02-10 RX ADMIN — MONTELUKAST 10 MG: 10 TABLET, FILM COATED ORAL at 22:49

## 2025-02-10 NOTE — ED NOTES
Jessika Mustafa    Nursing Report ED to Floor:  Mental status: ao4  Ambulatory status: independent  Oxygen Therapy:  ra  Cardiac Rhythm: nsr  Admitted from: ed  Safety Concerns:  none  Precautions: droplet  Social Issues: none  ED Room #:  15    ED Nurse Phone Extension - [phone] or may call [phone].      HPI:   Chief Complaint   Patient presents with    Shortness of Breath       Past Medical History:  Past Medical History:   Diagnosis Date    Anemia DK    Arthritis, rheumatoid     High risk medication use 09/19/2024    History of abnormal cervical Papanicolaou smear     HPV + non 16/18    HPV (human papilloma virus) infection DK    Hyperlipidemia 2022    Hypertension     Migraine headache     Osteopenia     Rheumatoid arthritis         Past Surgical History:  Past Surgical History:   Procedure Laterality Date    COLONOSCOPY  05/01/2017    benign polyps; repeat in 5 years    COLPOSCOPY W/ BIOPSY / CURETTAGE      10/15/2020, 9/19/2019, 09/06/2018    KNEE CARTILAGE SURGERY      KNEE SURGERY Bilateral     TOE TENDON REPAIR      TUBAL ABDOMINAL LIGATION  2006    WISDOM TOOTH EXTRACTION          Admitting Doctor:   Malinda QUIROS MD    Consulting Provider(s):  Consults       No orders found from 1/12/2025 to 2/11/2025.             Admitting Diagnosis:   The primary encounter diagnosis was Influenza A. Diagnoses of COPD with acute exacerbation and Acute on chronic respiratory failure with hypoxia were also pertinent to this visit.    Most Recent Vitals:   Vitals:    02/10/25 1754 02/10/25 1759 02/10/25 1800 02/10/25 1830   BP:   162/92 170/87   BP Location:       Patient Position:       Pulse: 86 82  82   Resp:       Temp:       TempSrc:       SpO2: 92% 92%  93%   Weight:       Height:           Active LDAs/IV Access:   Lines, Drains & Airways       Active LDAs       Name Placement date Placement time Site Days    Peripheral IV 02/10/25 1257 Left Antecubital 02/10/25  1257  Antecubital  less than 1                    Labs  (abnormal labs have a star):   Labs Reviewed   COVID-19 AND FLU A/B, NP SWAB IN TRANSPORT MEDIA 1 HR TAT - Abnormal; Notable for the following components:       Result Value    Influenza A PCR Detected (*)     All other components within normal limits    Narrative:     Fact sheet for providers: https://www.fda.gov/media/669525/download    Fact sheet for patients: https://www.fda.gov/media/156790/download    Test performed by PCR.   COMPREHENSIVE METABOLIC PANEL - Abnormal; Notable for the following components:    CO2 30.0 (*)     All other components within normal limits    Narrative:     GFR Categories in Chronic Kidney Disease (CKD)      GFR Category          GFR (mL/min/1.73)    Interpretation  G1                     90 or greater         Normal or high (1)  G2                      60-89                Mild decrease (1)  G3a                   45-59                Mild to moderate decrease  G3b                   30-44                Moderate to severe decrease  G4                    15-29                Severe decrease  G5                    14 or less           Kidney failure          (1)In the absence of evidence of kidney disease, neither GFR category G1 or G2 fulfill the criteria for CKD.    eGFR calculation 2021 CKD-EPI creatinine equation, which does not include race as a factor   MANUAL DIFFERENTIAL - Abnormal; Notable for the following components:    Eosinophil % 0.0 (*)     All other components within normal limits   BNP (IN-HOUSE) - Normal    Narrative:     This assay is used as an aid in the diagnosis of individuals suspected of having heart failure. It can be used as an aid in the diagnosis of acute decompensated heart failure (ADHF) in patients presenting with signs and symptoms of ADHF to the emergency department (ED). In addition, NT-proBNP of <300 pg/mL indicates ADHF is not likely.    Age Range Result Interpretation  NT-proBNP Concentration (pg/mL:      <50             Positive            >450                    Gray                 300-450                    Negative             <300    50-75           Positive            >900                  Gray                300-900                  Negative            <300      >75             Positive            >1800                  Gray                300-1800                  Negative            <300   TROPONIN - Normal    Narrative:     High Sensitive Troponin T Reference Range:  <14.0 ng/L- Negative Female for AMI  <22.0 ng/L- Negative Male for AMI  >=14 - Abnormal Female indicating possible myocardial injury.  >=22 - Abnormal Male indicating possible myocardial injury.   Clinicians would have to utilize clinical acumen, EKG, Troponin, and serial changes to determine if it is an Acute Myocardial Infarction or myocardial injury due to an underlying chronic condition.        CBC WITH AUTO DIFFERENTIAL - Normal    Narrative:     The previously reported component NRBC is no longer being reported. Previous result was 0.0 /100 WBC (Reference Range: 0.0-0.2 /100 WBC) on 2/10/2025 at 1316 EST.   HIGH SENSITIVITIY TROPONIN T 1HR - Normal    Narrative:     High Sensitive Troponin T Reference Range:  <14.0 ng/L- Negative Female for AMI  <22.0 ng/L- Negative Male for AMI  >=14 - Abnormal Female indicating possible myocardial injury.  >=22 - Abnormal Male indicating possible myocardial injury.   Clinicians would have to utilize clinical acumen, EKG, Troponin, and serial changes to determine if it is an Acute Myocardial Infarction or myocardial injury due to an underlying chronic condition.        COVID PRE-OP / PRE-PROCEDURE SCREENING ORDER (NO ISOLATION)    Narrative:     The following orders were created for panel order COVID PRE-OP / PRE-PROCEDURE SCREENING ORDER (NO ISOLATION) - Swab, Nasopharynx.  Procedure                               Abnormality         Status                     ---------                               -----------         ------                      COVID-19 and FLU A/B PCR...[[id number]]  Abnormal            Final result                 Please view results for these tests on the individual orders.   RAINBOW DRAW    Narrative:     The following orders were created for panel order [place] Draw.  Procedure                               Abnormality         Status                     ---------                               -----------         ------                     Green Top (Gel)[[id number]]                                  Final result               Lavender Top[[id number]]                                     Final result               Gold Top - SST[[id number]]                                   Final result               Gray Top[[id number]]                                         Final result               Light Blue Top[[id number]]                                   Final result                 Please view results for these tests on the individual orders.   CBC AND DIFFERENTIAL    Narrative:     The following orders were created for panel order CBC & Differential.  Procedure                               Abnormality         Status                     ---------                               -----------         ------                     CBC Auto Differential[[id number]]        Normal              Final result               Scan Slide[[id number]]                                                                    Please view results for these tests on the individual orders.   GREEN TOP   LAVENDER TOP   GOLD TOP - SST   GRAY TOP   LIGHT BLUE TOP       Meds Given in ED:   Medications   sodium chloride 0.9 % flush 10 mL (has no administration in time range)   ipratropium-albuterol (DUO-NEB) nebulizer solution 3 mL (3 mL Nebulization Given 2/10/25 1611)   methylPREDNISolone sodium succinate (SOLU-Medrol) injection 125 mg (125 mg Intravenous Given 2/10/25 1531)   iopamidol (ISOVUE-370) 76 % injection 70 mL (70 mL Intravenous Given 2/10/25 1419)           Last [facility]  score:                                                          Dysphagia screening results:  Patient Factors Component (Dysphagia:Stroke or Rule-out)  Best Eye Response: 4-->(E4) spontaneous (02/10/25 1757)  Best Motor Response: 6-->(M6) obeys commands (02/10/25 1757)  Best Verbal Response: 5-->(V5) oriented (02/10/25 1757)  [place] Coma Scale Score: 15 (02/10/25 1757)     [place] Coma Scale:  No data recorded     CIWA:        Restraint Type:            Isolation Status:  Droplet

## 2025-02-10 NOTE — H&P
[facility] Medicine Services  HISTORY AND PHYSICAL    Patient Name: Jessika Mustafa  : 1960  MRN: [id number]  Primary Care Physician: Selin Marte PA  Date of admission: 2/10/2025    Subjective   Subjective     Chief Complaint:  Flu-like symptoms    HPI:  Jessika Mustafa is a 64 y.o. female with PMH significant for RA (chronic immunosuppression), GERD, migraines, COPD who presents to [facility] ED with increased shortness of breath and flu-like symptoms. Patient states she had a migraine with sinus pressure and congestion that started last Tuesday, . She went to her PCP on Thursday, , and was prescribed prednisone and doxycycline for an URI that was thought to be a reoccurrence of a URI that she had 6 weeks ago. She was not checked for the flu. Patient started having increased shortness of breath on Thursday evening and it progressed to the point that she felt she needed to be seen in the ED. Patient notes accompanying cough, night sweats, and diarrhea. She also reports increased pain in her LUQ when taking a deep breath. She denies chills, abdominal pain, chest pain, dysuria. Patient states she was diagnosed with COPD 2 weeks ago after getting PFTs.      In ED, patient's VS are stable. She was placed on 2L NC for O2 sat 87% on room air. Labs are notable for positive flu A but are otherwise unremarkable. CXR shows streaky perihilar opacities and peribronchial cuffing which can be seen with viral infectious process. CTA is negative for PE or other acute pulmonary findings. Troponin is negative and EKG does not show ST elevation.       Personal History     Past Medical History:   Diagnosis Date    Anemia DK    Arthritis, rheumatoid     Chronic obstructive pulmonary disease with acute exacerbation 2/10/2025    High risk medication use 2024    History of abnormal cervical Papanicolaou smear     HPV + non 16/18    HPV (human papilloma virus) infection DK    Hyperlipidemia  2022    Hypertension     Migraine headache     Osteopenia     Rheumatoid arthritis              Past Surgical History:   Procedure Laterality Date    COLONOSCOPY  05/01/2017    benign polyps; repeat in 5 years    COLPOSCOPY W/ BIOPSY / CURETTAGE      10/15/2020, 9/19/2019, 09/06/2018    KNEE CARTILAGE SURGERY      KNEE SURGERY Bilateral     TOE TENDON REPAIR      TUBAL ABDOMINAL LIGATION  2006    WISDOM TOOTH EXTRACTION         Family History:  family history includes Alzheimer's disease in her mother; Arthritis in her father and mother; Asthma in her sister; Breast cancer in her paternal cousin; Diabetes in her brother, maternal grandmother, mother, paternal grandmother, and sister; Gout in her brother; Heart disease in her mother; Hypertension in her brother, father, mother, and sister; Kidney disease in her brother and father; Lung disease in her father; Prostate cancer in her brother; Stroke in her mother.     Social History:  reports that she has been smoking cigarettes. She has a 15 pack-year smoking history. She has been exposed to tobacco smoke. She has never used smokeless tobacco. She reports that she does not drink alcohol and does not use drugs.  Social History     Social History Narrative    Not on file       Medications:  SUMAtriptan, Tofacitinib Citrate ER, Varenicline Tartrate (Starter), azelastine, cholecalciferol, citalopram, fluticasone, latanoprost, montelukast, olmesartan-hydrochlorothiazide, pantoprazole, and potassium chloride    Allergies   Allergen Reactions    Lisinopril Cough    Losartan Cough    Metronidazole Rash      rash       Objective   Objective     Vital Signs:   Temp:  [98 °F (36.7 °C)] 98 °F (36.7 °C)  Heart Rate:  [78-98] 82  Resp:  [16-18] 16  BP: (154-171)/() 170/87  Flow (L/min) (Oxygen Therapy):  [2] 2    Physical Exam   Constitutional: Awake, alert  Eyes: PERRLA, sclerae anicteric, no conjunctival injection  HENT: NCAT, mucous membranes moist  Neck: Supple, no  thyromegaly, no lymphadenopathy, trachea midline  Respiratory: Diminished to auscultation all lung fields bilaterally, nonlabored respirations, 91% on room air   Cardiovascular: RRR, no murmurs, rubs, or gallops, palpable pedal pulses bilaterally  Gastrointestinal: Positive bowel sounds, soft, nontender, nondistended  Musculoskeletal: No bilateral ankle edema, no clubbing or cyanosis to extremities  Psychiatric: Appropriate affect, cooperative  Neurologic: Oriented x 3, strength symmetric in all extremities, Cranial Nerves grossly intact to confrontation, speech clear  Skin: No rashes      Result Review:  I have personally reviewed the results from the time of this admission to 2/10/2025 19:24 EST and agree with these findings:  [x]  Laboratory list / accordion  []  Microbiology  [x]  Radiology  [x]  EKG/Telemetry   []  Cardiology/Vascular   []  Pathology  [x]  Old records  []  Other:  Most notable findings include:     LAB RESULTS:      Lab 02/10/25  1256   WBC 6.82   HEMOGLOBIN 14.1   HEMATOCRIT 42.2   PLATELETS 243   NEUTROS ABS 4.57   EOS ABS 0.00   MCV 88.7         Lab 02/10/25  1256   SODIUM 141   POTASSIUM 3.6   CHLORIDE 100   CO2 30.0*   ANION GAP 11.0   BUN 17   CREATININE 0.91   EGFR 71.5   GLUCOSE 93   CALCIUM 10.2         Lab 02/10/25  1256   TOTAL PROTEIN 8.1   ALBUMIN 4.5   GLOBULIN 3.6   ALT (SGPT) 19   AST (SGOT) 22   BILIRUBIN 0.7   ALK PHOS 54         Lab 02/10/25  1527 02/10/25  1256   PROBNP  --  57.2   HSTROP T 9 10                 Brief Urine Lab Results  (Last result in the past 365 days)        Color   Clarity   Blood   Leuk Est   Nitrite   Protein   CREAT   Urine HCG        03/03/24 2320 Yellow   Clear   Trace   Negative   Negative   Negative                 Microbiology Results (last 10 days)       Procedure Component Value - Date/Time    COVID PRE-OP / PRE-PROCEDURE SCREENING ORDER (NO ISOLATION) - Swab, Nasopharynx [[id number]]  (Abnormal) Collected: 02/10/25 1253    Lab Status: Final  result Specimen: Swab from Nasopharynx Updated: 02/10/25 1345    Narrative:      The following orders were created for panel order COVID PRE-OP / PRE-PROCEDURE SCREENING ORDER (NO ISOLATION) - Swab, Nasopharynx.  Procedure                               Abnormality         Status                     ---------                               -----------         ------                     COVID-19 and FLU A/B PCR...[[id number]]  Abnormal            Final result                 Please view results for these tests on the individual orders.    COVID-19 and FLU A/B PCR, 1 HR TAT - Swab, Nasopharynx [[id number]]  (Abnormal) Collected: 02/10/25 1253    Lab Status: Final result Specimen: Swab from Nasopharynx Updated: 02/10/25 1345     COVID19 Not Detected     Influenza A PCR Detected     Influenza B PCR Not Detected    Narrative:      Fact sheet for providers: https://www.fda.gov/media/972077/download    Fact sheet for patients: https://www.fda.gov/media/949679/download    Test performed by PCR.            CT Angiogram Chest    Result Date: 2/10/2025  CT ANGIOGRAM CHEST Date of Exam: 2/10/2025 2:15 PM EST Indication: soa, chest pain. Comparison: None available. Technique: CTA of the chest was performed after the uneventful intravenous administration of 71 mL Isovue-370. Reconstructed coronal and sagittal images were also obtained. In addition, a 3-D volume rendered image was created for interpretation. Automated exposure control and iterative reconstruction methods were used. Findings: There is no worrisome thoracic adenopathy. No acute findings are present in the partially characterized upper abdomen. There is no pleural or pericardial effusion. Mildly atherosclerotic, nonaneurysmal thoracic aorta. The pulmonary arteries are well opacified, without evidence of focal filling defect concerning for acute pulmonary embolus. The osseous structures demonstrate no evidence of acute fracture or aggressive osseous lesion. The lung  fields demonstrate mild to moderate emphysema. There is no  evidence of acute infectious or inflammatory process. There is no distinct suspicious focal pulmonary nodularity. Central airways are clear.     Impression: Impression: No pulmonary embolus or other acute finding in the chest. Chronic findings are present including moderate emphysema. Electronically Signed: Josh Gordon MD  2/10/2025 2:59 PM EST  Workstation ID: [id number]    XR Chest 1 View    Result Date: 2/10/2025  XR CHEST 1 VW Date of Exam: 2/10/2025 1:06 PM EST Indication: SOA triage protocol Comparison: None available. Findings: Heart size is normal. Mild streaky perihilar opacities and peribronchial cuffing which can be seen with viral infectious process. No discrete consolidation. No pneumothorax. No pleural effusion. The osseous structures are grossly intact.     Impression: Impression: Streaky perihilar opacities and peribronchial cuffing which can be seen with viral infectious process. Electronically Signed: Panfilo Beavers MD  2/10/2025 2:09 PM EST  Workstation ID: [id number]         Assessment & Plan   Assessment & Plan       Influenza A    Seropositive rheumatoid arthritis    Immunodeficiency due to drug therapy    HTN (hypertension)    Chronic obstructive pulmonary disease with acute exacerbation    Jessika Mustafa is a 64 y.o. female with PMH significant for RA (chronic immunosuppression), GERD, migraines, COPD who presents to [facility] ED with increased shortness of breath and flu-like symptoms.     Flu A  COPD exacerbation  --Prescribed doxycycline and prednisone 20 mg daily for URI at PCP on 2/6  --WBCs wnl  --CXR with treaky perihilar opacities and peribronchial cuffing which can be seen with viral infectious process  --CTA negative for PE or other acute pulmonary findings  --+ Flu A, outside window for Tamiflu  --Supportive care with Tessalon perles, Tylenol, ibuprofen for cough, body aches, fever  --O2 as needed to maintain sats 90-95% with flu,  not on home O2  --Diagnosed with COPD 2 weeks ago with PFTs  --Will treat as COPD exacerbation given increasing shortness of breath x 5 days  --Start Zosyn with transition to PO Augment x 5 days, pt with known immunosuppression d/t RA  --Continue doxycyline x 5 more days  --Prednisone burst 40 mg daily x 5 days, no wheezing on exam, on PPI so covered for stress ppx  --Mucinex 1200 mg BID x 5 days  --Continue Singular nightly  --Scheduled and prn duo-nebs  --Pulmonary toilet, incentive spirometry  --Previously on Chantix, declines nicotine patch  --AM CBC, BMP    HTN  --Elevated BP on admission  --Hold home olmesartan-HCTZ  --Patient reports allergy to losartan and olmesartan not on formulary, so will treat BP with amlodipine 5 mg daily, monitor    RA  --On Xeljanz    Chronic rhinitis  --Continue azalestine, Flonase    GERD  --Continue Protonix    Depression  --Continue Celexa      DVT prophylaxis:  Mechanical    CODE STATUS:    Level Of Support Discussed With: Patient  Code Status (Patient has no pulse and is not breathing): CPR (Attempt to Resuscitate)  Medical Interventions (Patient has pulse or is breathing): Full Support      Expected Discharge  Expected Discharge Date: 2/11/2025; Expected Discharge Time:       This note has been completed as part of a split-shared workflow.     Signature: Electronically signed by ANNALEE Quinones, 02/10/25, 6:23 PM EST

## 2025-02-10 NOTE — ED PROVIDER NOTES
Subjective   History of Present Illness  Patient is a pleasant 64-year-old female with a history of COPD, hypertension, migraine headaches.  Presents to the emergency department with flulike symptoms for the past 6 days.  She states that last Tuesday she developed headache and sinus pressure.  She went to her primary care provider and was started on antibiotic and a low-dose prednisone.  On Thursday developed shortness of breath and fatigue.  Throughout this past 6 days she has had intermittent chills and mild sweats and suspect she has had a fever but has not actually taken her temperature.  Shortness of breath is progressively worsened over the past 4 days and this will prompts her visit to the emergency department.  She also notes some mild chest pain in her left lower ribs.  Denies abdominal pain, vomiting, or diarrhea.  Denies any known sick contacts.        Review of Systems   All other systems reviewed and are negative.      Past Medical History:   Diagnosis Date    Anemia DK    Arthritis, rheumatoid     Chronic obstructive pulmonary disease with acute exacerbation 2/10/2025    High risk medication use 09/19/2024    History of abnormal cervical Papanicolaou smear     HPV + non 16/18    HPV (human papilloma virus) infection DK    Hyperlipidemia 2022    Hypertension     Migraine headache     Osteopenia     Rheumatoid arthritis        Allergies   Allergen Reactions    Lisinopril Cough    Losartan Cough    Metronidazole Rash      rash       Past Surgical History:   Procedure Laterality Date    COLONOSCOPY  05/01/2017    benign polyps; repeat in 5 years    COLPOSCOPY W/ BIOPSY / CURETTAGE      10/15/2020, 9/19/2019, 09/06/2018    KNEE CARTILAGE SURGERY      KNEE SURGERY Bilateral     TOE TENDON REPAIR      TUBAL ABDOMINAL LIGATION  2006    WISDOM TOOTH EXTRACTION         Family History   Problem Relation Age of Onset    Arthritis Mother     Diabetes Mother     Heart disease Mother     Hypertension Mother      Stroke Mother     Alzheimer's disease Mother     Arthritis Father     Hypertension Father     Kidney disease Father     Lung disease Father         BLACK LUNG    Asthma Sister     Diabetes Sister     Hypertension Sister     Diabetes Brother     Hypertension Brother     Kidney disease Brother     Gout Brother     Diabetes Maternal Grandmother     Diabetes Paternal Grandmother     Breast cancer Paternal Cousin     Prostate cancer Brother     Ovarian cancer Neg Hx     Uterine cancer Neg Hx     Colon cancer Neg Hx        Social History     Socioeconomic History    Marital status: Single   Tobacco Use    Smoking status: Every Day     Average packs/day: 0.5 packs/day for 30.2 years (15.0 ttl pk-yrs)     Types: Cigarettes     Start date: 12/1/2024     Passive exposure: Past    Smokeless tobacco: Never    Tobacco comments:     30+ years, as of 09/09/2021   Vaping Use    Vaping status: Never Used   Substance and Sexual Activity    Alcohol use: Never    Drug use: Never    Sexual activity: Yes     Partners: Male     Birth control/protection: Tubal ligation           Objective   Physical Exam  Vitals and nursing note reviewed.   Constitutional:       General: She is not in acute distress.     Appearance: She is well-developed.   HENT:      Head: Normocephalic and atraumatic.   Eyes:      Pupils: Pupils are equal, round, and reactive to light.   Neck:      Thyroid: No thyromegaly.      Vascular: No JVD.   Cardiovascular:      Rate and Rhythm: Normal rate and regular rhythm.      Heart sounds: Normal heart sounds. No murmur heard.     No friction rub. No gallop.   Pulmonary:      Effort: Respiratory distress present.      Breath sounds: Decreased breath sounds present. No rales.      Comments: Moderate increased work of breathing.  Diminished breath sounds bilaterally  Abdominal:      Palpations: Abdomen is soft.      Tenderness: There is no abdominal tenderness.   Musculoskeletal:         General: Normal range of motion.       Cervical back: Normal range of motion and neck supple.   Lymphadenopathy:      Cervical: No cervical adenopathy.   Skin:     General: Skin is warm and dry.   Neurological:      Mental Status: She is alert and oriented to person, place, and time.         Procedures           ED Course      Recent Results (from the past 24 hours)   High Sensitivity Troponin T 1Hr    Collection Time: 02/10/25  3:27 PM    Specimen: Blood   Result Value Ref Range    HS Troponin T 9 <14 ng/L    Troponin T Numeric Delta -1 Abnormal if >/=3 ng/L   CBC (No Diff)    Collection Time: 02/11/25  7:09 AM    Specimen: Blood   Result Value Ref Range    WBC 6.76 3.40 - 10.80 10*3/mm3    RBC 4.16 3.77 - 5.28 10*6/mm3    Hemoglobin 12.5 12.0 - 15.9 g/dL    Hematocrit 36.8 34.0 - 46.6 %    MCV 88.5 79.0 - 97.0 fL    MCH 30.0 26.6 - 33.0 pg    MCHC 34.0 31.5 - 35.7 g/dL    RDW 13.6 12.3 - 15.4 %    RDW-SD 44.3 37.0 - 54.0 fl    MPV 9.9 6.0 - 12.0 fL    Platelets 251 140 - 450 10*3/mm3   Basic Metabolic Panel    Collection Time: 02/11/25  7:10 AM    Specimen: Blood   Result Value Ref Range    Glucose 116 (H) 65 - 99 mg/dL    BUN 16 8 - 23 mg/dL    Creatinine 0.90 0.57 - 1.00 mg/dL    Sodium 144 136 - 145 mmol/L    Potassium 3.9 3.5 - 5.2 mmol/L    Chloride 104 98 - 107 mmol/L    CO2 30.0 (H) 22.0 - 29.0 mmol/L    Calcium 9.5 8.6 - 10.5 mg/dL    BUN/Creatinine Ratio 17.8 7.0 - 25.0    Anion Gap 10.0 5.0 - 15.0 mmol/L    eGFR 72.4 >60.0 mL/min/1.73     Note: In addition to lab results from this visit, the labs listed above may include labs taken at another facility or during a different encounter within the last 24 hours. Please correlate lab times with ED admission and discharge times for further clarification of the services performed during this visit.    CT Angiogram Chest   Final Result   Impression:   No pulmonary embolus or other acute finding in the chest.      Chronic findings are present including moderate emphysema.            Electronically  Signed: Josh Gordon MD     2/10/2025 2:59 PM EST     Workstation ID: [id number]      XR Chest 1 View   Final Result   Impression:   Streaky perihilar opacities and peribronchial cuffing which can be seen with viral infectious process.               Electronically Signed: Panfilo Beavers MD     2/10/2025 2:09 PM EST     Workstation ID: [id number]        Vitals:    02/11/25 0829 02/11/25 0900 02/11/25 1123 02/11/25 1141   BP: 160/88  160/94    BP Location:   Right arm    Patient Position:   Lying    Pulse: 80 71 104 89   Resp:   22 18   Temp:   98.2 °F (36.8 °C)    TempSrc:   Oral    SpO2:  92% 93% 93%   Weight:       Height:         Medications   sodium chloride 0.9 % flush 10 mL (has no administration in time range)   azelastine (ASTELIN) nasal spray 1 spray (1 spray Each Nare Given 2/11/25 0827)   fluticasone (FLONASE) 50 MCG/ACT nasal spray 1 spray (1 spray Each Nare Given 2/11/25 0827)   potassium chloride (KLOR-CON M20) CR tablet 30 mEq (30 mEq Oral Given 2/11/25 0829)   latanoprost (XALATAN) 0.005 % ophthalmic solution 1 drop (1 drop Both Eyes Given 2/11/25 0014)   montelukast (SINGULAIR) tablet 10 mg (10 mg Oral Given 2/10/25 2249)   sodium chloride 0.9 % flush 10 mL ( Intravenous Canceled Entry 2/11/25 0948)   sodium chloride 0.9 % flush 10 mL (has no administration in time range)   sodium chloride 0.9 % infusion 40 mL (has no administration in time range)   nitroglycerin (NITROSTAT) SL tablet 0.4 mg (has no administration in time range)   sennosides-docusate (PERICOLACE) 8.6-50 MG per tablet 2 tablet (has no administration in time range)     And   polyethylene glycol (MIRALAX) packet 17 g (has no administration in time range)     And   bisacodyl (DULCOLAX) EC tablet 5 mg (has no administration in time range)     And   bisacodyl (DULCOLAX) suppository 10 mg (has no administration in time range)   doxycycline (MONODOX) capsule 100 mg (100 mg Oral Given 2/11/25 1826)   ibuprofen (ADVIL,MOTRIN) tablet 200 mg (has no  administration in time range)   guaiFENesin (MUCINEX) 12 hr tablet 1,200 mg (1,200 mg Oral Given 2/11/25 0829)   predniSONE (DELTASONE) tablet 40 mg (40 mg Oral Given 2/11/25 0829)   ipratropium-albuterol (DUO-NEB) nebulizer solution 3 mL (3 mL Nebulization Given 2/11/25 1141)   ipratropium-albuterol (DUO-NEB) nebulizer solution 3 mL (has no administration in time range)   benzonatate (TESSALON) capsule 100 mg (100 mg Oral Given 2/10/25 2248)   amLODIPine (NORVASC) tablet 5 mg (5 mg Oral Given 2/11/25 0829)   pantoprazole (PROTONIX) EC tablet 40 mg (40 mg Oral Given 2/10/25 2249)   citalopram (CeleXA) tablet 20 mg (20 mg Oral Given 2/10/25 2249)   ipratropium-albuterol (DUO-NEB) nebulizer solution 3 mL (3 mL Nebulization Given 2/10/25 1611)   methylPREDNISolone sodium succinate (SOLU-Medrol) injection 125 mg (125 mg Intravenous Given 2/10/25 1531)   iopamidol (ISOVUE-370) 76 % injection 70 mL (70 mL Intravenous Given 2/10/25 1419)   piperacillin-tazobactam (ZOSYN) 3.375 g IVPB in 100 mL NS MBP (CD) (0 g Intravenous Stopped 2/10/25 2340)     ECG/EMG Results (last 24 hours)       Procedure Component Value Units Date/Time    ECG 12 Lead ED Triage Standing Order; SOA [[id number]] Collected: 02/10/25 1158     Updated: 02/11/25 1258     QT Interval 342 ms      QTC Interval 409 ms     Narrative:      Test Reason : ED Triage Standing Order~  Blood Pressure :   */*   mmHG  Vent. Rate :  86 BPM     Atrial Rate :  86 BPM     P-R Int : 134 ms          QRS Dur :  78 ms      QT Int : 342 ms       P-R-T Axes :  83  77  66 degrees    QTcB Int : 409 ms    Normal sinus rhythm  Nonspecific T wave abnormality  Abnormal ECG  No previous ECGs available  Confirmed by MD MONROY CORY ([id number]) on 2/11/2025 12:58:27 PM    Referred By: ED MD           Confirmed By: YULIET MONROY MD          ECG 12 Lead ED Triage Standing Order; SOA   Final Result   Test Reason : ED Triage Standing Order~   Blood Pressure :   */*   mmHG   Vent. Rate :  86 BPM      Atrial Rate :  86 BPM      P-R Int : 134 ms          QRS Dur :  78 ms       QT Int : 342 ms       P-R-T Axes :  83  77  66 degrees     QTcB Int : 409 ms      Normal sinus rhythm   Nonspecific T wave abnormality   Abnormal ECG   No previous ECGs available   Confirmed by MD MONROY CORY ([id number]) on 2/11/2025 12:58:27 PM      Referred By: ED MD           Confirmed By: YULIET MONROY MD                                                           Medical Decision Making  Unfortunately, O2 saturation is 85% with ambulation.  Pt significant weakness also persists, consistent with Influenza related COPD exacerbation.  Case discussed with internal medicine attending and pt will be admitted for further evaluation and management.    Problems Addressed:  Acute on chronic respiratory failure with hypoxia: complicated acute illness or injury  COPD with acute exacerbation: complicated acute illness or injury  Influenza A: complicated acute illness or injury    Amount and/or Complexity of Data Reviewed  External Data Reviewed: notes.  Labs: ordered. Decision-making details documented in ED Course.  Radiology: ordered and independent interpretation performed. Decision-making details documented in ED Course.  ECG/medicine tests: ordered and independent interpretation performed. Decision-making details documented in ED Course.    Risk  Prescription drug management.  Decision regarding hospitalization.        Final diagnoses:   Influenza A   COPD with acute exacerbation   Acute on chronic respiratory failure with hypoxia       ED Disposition  ED Disposition       ED Disposition   Decision to Admit    Condition   --    Comment   Level of Care: Telemetry [5]   Diagnosis: Influenza A [[id number]]                    Yuliet Monroy DO  02/11/25 1327

## 2025-02-11 LAB
ANION GAP SERPL CALCULATED.3IONS-SCNC: 10 MMOL/L (ref 5–15)
BUN SERPL-MCNC: 16 MG/DL (ref 8–23)
BUN/CREAT SERPL: 17.8 (ref 7–25)
CALCIUM SPEC-SCNC: 9.5 MG/DL (ref 8.6–10.5)
CHLORIDE SERPL-SCNC: 104 MMOL/L (ref 98–107)
CO2 SERPL-SCNC: 30 MMOL/L (ref 22–29)
CREAT SERPL-MCNC: 0.9 MG/DL (ref 0.57–1)
DEPRECATED RDW RBC AUTO: 44.3 FL (ref 37–54)
EGFRCR SERPLBLD CKD-EPI 2021: 72.4 ML/MIN/1.73
ERYTHROCYTE [DISTWIDTH] IN BLOOD BY AUTOMATED COUNT: 13.6 % (ref 12.3–15.4)
GLUCOSE SERPL-MCNC: 116 MG/DL (ref 65–99)
HCT VFR BLD AUTO: 36.8 % (ref 34–46.6)
HGB BLD-MCNC: 12.5 G/DL (ref 12–15.9)
MCH RBC QN AUTO: 30 PG (ref 26.6–33)
MCHC RBC AUTO-ENTMCNC: 34 G/DL (ref 31.5–35.7)
MCV RBC AUTO: 88.5 FL (ref 79–97)
PLATELET # BLD AUTO: 251 10*3/MM3 (ref 140–450)
PMV BLD AUTO: 9.9 FL (ref 6–12)
POTASSIUM SERPL-SCNC: 3.9 MMOL/L (ref 3.5–5.2)
QT INTERVAL: 342 MS
QTC INTERVAL: 409 MS
RBC # BLD AUTO: 4.16 10*6/MM3 (ref 3.77–5.28)
SODIUM SERPL-SCNC: 144 MMOL/L (ref 136–145)
WBC NRBC COR # BLD AUTO: 6.76 10*3/MM3 (ref 3.4–10.8)

## 2025-02-11 PROCEDURE — 63710000001 PREDNISONE PER 1 MG: Performed by: NURSE PRACTITIONER

## 2025-02-11 PROCEDURE — 25010000002 PIPERACILLIN SOD-TAZOBACTAM PER 1 G: Performed by: NURSE PRACTITIONER

## 2025-02-11 PROCEDURE — 94799 UNLISTED PULMONARY SVC/PX: CPT

## 2025-02-11 PROCEDURE — 99232 SBSQ HOSP IP/OBS MODERATE 35: CPT | Performed by: INTERNAL MEDICINE

## 2025-02-11 PROCEDURE — G0378 HOSPITAL OBSERVATION PER HR: HCPCS

## 2025-02-11 PROCEDURE — 80048 BASIC METABOLIC PNL TOTAL CA: CPT | Performed by: NURSE PRACTITIONER

## 2025-02-11 PROCEDURE — 85027 COMPLETE CBC AUTOMATED: CPT | Performed by: NURSE PRACTITIONER

## 2025-02-11 RX ORDER — ACETAMINOPHEN 325 MG/1
650 TABLET ORAL EVERY 6 HOURS PRN
Status: DISCONTINUED | OUTPATIENT
Start: 2025-02-11 | End: 2025-02-12 | Stop reason: HOSPADM

## 2025-02-11 RX ADMIN — POTASSIUM CHLORIDE 30 MEQ: 1500 TABLET, EXTENDED RELEASE ORAL at 08:29

## 2025-02-11 RX ADMIN — PREDNISONE 40 MG: 20 TABLET ORAL at 08:29

## 2025-02-11 RX ADMIN — GUAIFENESIN 1200 MG: 600 TABLET ORAL at 21:16

## 2025-02-11 RX ADMIN — DOXYCYCLINE 100 MG: 100 CAPSULE ORAL at 00:14

## 2025-02-11 RX ADMIN — GUAIFENESIN 1200 MG: 600 TABLET ORAL at 08:29

## 2025-02-11 RX ADMIN — AMLODIPINE BESYLATE 5 MG: 5 TABLET ORAL at 08:29

## 2025-02-11 RX ADMIN — Medication 10 ML: at 21:18

## 2025-02-11 RX ADMIN — CITALOPRAM HYDROBROMIDE 20 MG: 20 TABLET ORAL at 21:17

## 2025-02-11 RX ADMIN — PANTOPRAZOLE SODIUM 40 MG: 40 TABLET, DELAYED RELEASE ORAL at 21:16

## 2025-02-11 RX ADMIN — IPRATROPIUM BROMIDE AND ALBUTEROL SULFATE 3 ML: 2.5; .5 SOLUTION RESPIRATORY (INHALATION) at 19:50

## 2025-02-11 RX ADMIN — DOXYCYCLINE 100 MG: 100 CAPSULE ORAL at 21:17

## 2025-02-11 RX ADMIN — AZELASTINE HYDROCHLORIDE 1 SPRAY: 137 SPRAY, METERED NASAL at 08:27

## 2025-02-11 RX ADMIN — IPRATROPIUM BROMIDE AND ALBUTEROL SULFATE 3 ML: 2.5; .5 SOLUTION RESPIRATORY (INHALATION) at 08:21

## 2025-02-11 RX ADMIN — LATANOPROST 1 DROP: 50 SOLUTION OPHTHALMIC at 21:18

## 2025-02-11 RX ADMIN — MONTELUKAST 10 MG: 10 TABLET, FILM COATED ORAL at 21:17

## 2025-02-11 RX ADMIN — IPRATROPIUM BROMIDE AND ALBUTEROL SULFATE 3 ML: 2.5; .5 SOLUTION RESPIRATORY (INHALATION) at 16:08

## 2025-02-11 RX ADMIN — IPRATROPIUM BROMIDE AND ALBUTEROL SULFATE 3 ML: 2.5; .5 SOLUTION RESPIRATORY (INHALATION) at 11:41

## 2025-02-11 RX ADMIN — LATANOPROST 1 DROP: 50 SOLUTION OPHTHALMIC at 00:14

## 2025-02-11 RX ADMIN — PIPERACILLIN AND TAZOBACTAM 3.38 G: 3; .375 INJECTION, POWDER, LYOPHILIZED, FOR SOLUTION INTRAVENOUS at 04:24

## 2025-02-11 RX ADMIN — DOXYCYCLINE 100 MG: 100 CAPSULE ORAL at 08:29

## 2025-02-11 RX ADMIN — ACETAMINOPHEN 650 MG: 325 TABLET ORAL at 19:20

## 2025-02-11 RX ADMIN — FLUTICASONE PROPIONATE 1 SPRAY: 50 SPRAY, METERED NASAL at 08:27

## 2025-02-11 RX ADMIN — IBUPROFEN 200 MG: 400 TABLET, FILM COATED ORAL at 17:43

## 2025-02-11 NOTE — PLAN OF CARE
Problem: Adult Inpatient Plan of Care  Goal: Plan of Care Review  Outcome: Progressing  Flowsheets (Taken 2/11/2025 1848)  Progress: improving  Plan of Care Reviewed With: patient  Goal: Patient-Specific Goal (Individualized)  Outcome: Progressing  Goal: Absence of Hospital-Acquired Illness or Injury  Outcome: Progressing  Intervention: Identify and Manage Fall Risk  Recent Flowsheet Documentation  Taken 2/11/2025 1830 by Rosa Pablo RN  Safety Promotion/Fall Prevention:   activity supervised   assistive device/personal items within reach   clutter free environment maintained   fall prevention program maintained   gait belt   nonskid shoes/slippers when out of bed   room organization consistent   safety round/check completed  Taken 2/11/2025 1600 by Rosa Pablo RN  Safety Promotion/Fall Prevention:   activity supervised   assistive device/personal items within reach   clutter free environment maintained   fall prevention program maintained   gait belt   nonskid shoes/slippers when out of bed   room organization consistent   safety round/check completed  Taken 2/11/2025 1400 by Rosa Pablo RN  Safety Promotion/Fall Prevention:   activity supervised   assistive device/personal items within reach   clutter free environment maintained   fall prevention program maintained   gait belt   nonskid shoes/slippers when out of bed   room organization consistent   safety round/check completed  Taken 2/11/2025 1200 by Rosa Pablo RN  Safety Promotion/Fall Prevention:   activity supervised   assistive device/personal items within reach   clutter free environment maintained   fall prevention program maintained   gait belt   nonskid shoes/slippers when out of bed   room organization consistent   safety round/check completed  Taken 2/11/2025 1000 by Rosa Pablo RN  Safety Promotion/Fall Prevention:   activity supervised   assistive device/personal items within reach   clutter free environment maintained   fall  prevention program maintained   gait belt   nonskid shoes/slippers when out of bed   room organization consistent   safety round/check completed  Taken 2/11/2025 0822 by Rosa Pablo RN  Safety Promotion/Fall Prevention:   activity supervised   assistive device/personal items within reach   clutter free environment maintained   fall prevention program maintained   gait belt   nonskid shoes/slippers when out of bed   room organization consistent   safety round/check completed  Intervention: Prevent Skin Injury  Recent Flowsheet Documentation  Taken 2/11/2025 1830 by Rosa Pablo RN  Body Position: sitting up in bed  Taken 2/11/2025 1600 by Rosa Pablo RN  Body Position: supine  Taken 2/11/2025 1400 by Rosa Pablo RN  Body Position: sitting up in bed  Taken 2/11/2025 1200 by Rosa Pablo RN  Body Position: sitting up in bed  Taken 2/11/2025 1000 by Rosa Pablo RN  Body Position: sitting up in bed  Taken 2/11/2025 0822 by Rosa Pablo RN  Body Position: sitting up in bed  Skin Protection: transparent dressing maintained  Intervention: Prevent and Manage VTE (Venous Thromboembolism) Risk  Recent Flowsheet Documentation  Taken 2/11/2025 0822 by Rosa Pablo RN  VTE Prevention/Management:   bilateral   SCDs (sequential compression devices) on  Goal: Optimal Comfort and Wellbeing  Outcome: Progressing  Intervention: Monitor Pain and Promote Comfort  Recent Flowsheet Documentation  Taken 2/11/2025 1743 by Rosa Pablo RN  Pain Management Interventions: pain medication given  Intervention: Provide Person-Centered Care  Recent Flowsheet Documentation  Taken 2/11/2025 0821 by Rosa Pablo RN  Trust Relationship/Rapport:   care explained   questions answered   questions encouraged   thoughts/feelings acknowledged  Goal: Readiness for Transition of Care  Outcome: Progressing   Goal Outcome Evaluation:  Plan of Care Reviewed With: patient        Progress: improving    VSS. RA. C/O headache this afternoon.  PRN administered per request. No further complaints.

## 2025-02-11 NOTE — PROGRESS NOTES
[facility] Medicine Services  PROGRESS NOTE    Patient Name: Jessika Mustafa  : 1960  MRN: [id number]    Date of Admission: 2/10/2025  Primary Care Physician: Selin Marte PA    Subjective   Subjective     CC:   dyspnea     HPI:  breathing is better, on RA at rest but still w signif dyspnea when walking.        Objective   Objective     Vital Signs:   Temp:  [97.8 °F (36.6 °C)-98.1 °F (36.7 °C)] 97.8 °F (36.6 °C)  Heart Rate:  [71-99] 71  Resp:  [16-18] 16  BP: (146-171)/() 160/88  Flow (L/min) (Oxygen Therapy):  [2] 2     Physical Exam:  Gen:  WD/WN woman in bed smiling, on RA,   Neuro: alert and oriented, clear speech, follows commands, grossly nonfocal  HEENT:  NC/AT  Neck:  Supple, no LAD  Heart RRR   Lungs not labored at rest, but breath sounds are very poor.  No wheeze   Abd:  Soft, nontender, no rebound or guarding, pos BS  Extrem:  No c/c/e      Results Reviewed:  LAB RESULTS:      Lab 25  0709 02/10/25  1527 02/10/25  1256   WBC 6.76  --  6.82   HEMOGLOBIN 12.5  --  14.1   HEMATOCRIT 36.8  --  42.2   PLATELETS 251  --  243   NEUTROS ABS  --   --  4.57   EOS ABS  --   --  0.00   MCV 88.5  --  88.7   HSTROP T  --  9 10         Lab 25  0710 02/10/25  1256   SODIUM 144 141   POTASSIUM 3.9 3.6   CHLORIDE 104 100   CO2 30.0* 30.0*   ANION GAP 10.0 11.0   BUN 16 17   CREATININE 0.90 0.91   EGFR 72.4 71.5   GLUCOSE 116* 93   CALCIUM 9.5 10.2         Lab 02/10/25  1256   TOTAL PROTEIN 8.1   ALBUMIN 4.5   GLOBULIN 3.6   ALT (SGPT) 19   AST (SGOT) 22   BILIRUBIN 0.7   ALK PHOS 54         Lab 02/10/25  1527 02/10/25  1256   PROBNP  --  57.2   HSTROP T 9 10                 Brief Urine Lab Results  (Last result in the past 365 days)        Color   Clarity   Blood   Leuk Est   Nitrite   Protein   CREAT   Urine HCG        24 2320 Yellow   Clear   Trace   Negative   Negative   Negative                   Microbiology Results Abnormal       Procedure Component  Value - Date/Time    COVID PRE-OP / PRE-PROCEDURE SCREENING ORDER (NO ISOLATION) - Swab, Nasopharynx [[id number]]  (Abnormal) Collected: 02/10/25 1253    Lab Status: Final result Specimen: Swab from Nasopharynx Updated: 02/10/25 1345    Narrative:      The following orders were created for panel order COVID PRE-OP / PRE-PROCEDURE SCREENING ORDER (NO ISOLATION) - Swab, Nasopharynx.  Procedure                               Abnormality         Status                     ---------                               -----------         ------                     COVID-19 and FLU A/B PCR...[[id number]]  Abnormal            Final result                 Please view results for these tests on the individual orders.    COVID-19 and FLU A/B PCR, 1 HR TAT - Swab, Nasopharynx [[id number]]  (Abnormal) Collected: 02/10/25 1253    Lab Status: Final result Specimen: Swab from Nasopharynx Updated: 02/10/25 1345     COVID19 Not Detected     Influenza A PCR Detected     Influenza B PCR Not Detected    Narrative:      Fact sheet for providers: https://www.fda.gov/media/277998/download    Fact sheet for patients: https://www.fda.gov/media/682638/download    Test performed by PCR.            CT Angiogram Chest    Result Date: 2/10/2025  CT ANGIOGRAM CHEST Date of Exam: 2/10/2025 2:15 PM EST Indication: soa, chest pain. Comparison: None available. Technique: CTA of the chest was performed after the uneventful intravenous administration of 71 mL Isovue-370. Reconstructed coronal and sagittal images were also obtained. In addition, a 3-D volume rendered image was created for interpretation. Automated exposure control and iterative reconstruction methods were used. Findings: There is no worrisome thoracic adenopathy. No acute findings are present in the partially characterized upper abdomen. There is no pleural or pericardial effusion. Mildly atherosclerotic, nonaneurysmal thoracic aorta. The pulmonary arteries are well opacified, without evidence of  focal filling defect concerning for acute pulmonary embolus. The osseous structures demonstrate no evidence of acute fracture or aggressive osseous lesion. The lung fields demonstrate mild to moderate emphysema. There is no  evidence of acute infectious or inflammatory process. There is no distinct suspicious focal pulmonary nodularity. Central airways are clear.     Impression: Impression: No pulmonary embolus or other acute finding in the chest. Chronic findings are present including moderate emphysema. Electronically Signed: Josh Gordon MD  2/10/2025 2:59 PM EST  Workstation ID: [id number]    XR Chest 1 View    Result Date: 2/10/2025  XR CHEST 1 VW Date of Exam: 2/10/2025 1:06 PM EST Indication: SOA triage protocol Comparison: None available. Findings: Heart size is normal. Mild streaky perihilar opacities and peribronchial cuffing which can be seen with viral infectious process. No discrete consolidation. No pneumothorax. No pleural effusion. The osseous structures are grossly intact.     Impression: Impression: Streaky perihilar opacities and peribronchial cuffing which can be seen with viral infectious process. Electronically Signed: Panfilo Beavers MD  2/10/2025 2:09 PM EST  Workstation ID: [id number]         Current medications:  Scheduled Meds:amLODIPine, 5 mg, Oral, Q24H  azelastine, 1 spray, Each Nare, Daily  citalopram, 20 mg, Oral, Nightly  doxycycline, 100 mg, Oral, Q12H  fluticasone, 1 spray, Each Nare, Daily  guaiFENesin, 1,200 mg, Oral, Q12H  ipratropium-albuterol, 3 mL, Nebulization, 4x Daily - RT  latanoprost, 1 drop, Both Eyes, Daily  montelukast, 10 mg, Oral, Nightly  pantoprazole, 40 mg, Oral, Nightly  potassium chloride, 30 mEq, Oral, Daily  predniSONE, 40 mg, Oral, Daily With Breakfast  sodium chloride, 10 mL, Intravenous, Q12H      Continuous Infusions:   PRN Meds:.  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        Brief Hospital Course to date:  Jessika Mustafa is a 64 y.o. female with RA (chronic immunosuppression), GERD, migraines, COPD admitted for flu symptoms x 5 days, with worsening dyspnea and diarrhea.  Recent PFTs indicated COPD 2 weeks ago     Flu A  COPD with exacerbation  - sat 87 on RA  - CTA chest no PE and no infiltrate; moderate emphysema changes.   - nebs, steroids,   - wait another day for bronchospasm to improve     Expected Discharge Location and Transportation: home by car  Expected Discharge   Expected Discharge Date: 2/12/2025; Expected Discharge Time:      VTE Prophylaxis:  Mechanical VTE prophylaxis orders are present.         AM-PAC 6 Clicks Score (PT): 24 (02/10/25 2121)    CODE STATUS:   Code Status and Medical Interventions: CPR (Attempt to Resuscitate); Full Support   Ordered at: 02/10/25 1912     Level Of Support Discussed With:    Patient     Code Status (Patient has no pulse and is not breathing):    CPR (Attempt to Resuscitate)     Medical Interventions (Patient has pulse or is breathing):    Full Support       Brenna Chong MD  02/11/25

## 2025-02-12 VITALS
BODY MASS INDEX: 27.99 KG/M2 | RESPIRATION RATE: 16 BRPM | OXYGEN SATURATION: 90 % | TEMPERATURE: 97.7 F | HEIGHT: 65 IN | DIASTOLIC BLOOD PRESSURE: 78 MMHG | HEART RATE: 79 BPM | SYSTOLIC BLOOD PRESSURE: 148 MMHG | WEIGHT: 168 LBS

## 2025-02-12 PROCEDURE — G0378 HOSPITAL OBSERVATION PER HR: HCPCS

## 2025-02-12 PROCEDURE — 99239 HOSP IP/OBS DSCHRG MGMT >30: CPT | Performed by: INTERNAL MEDICINE

## 2025-02-12 PROCEDURE — 63710000001 PREDNISONE PER 1 MG: Performed by: NURSE PRACTITIONER

## 2025-02-12 PROCEDURE — 94799 UNLISTED PULMONARY SVC/PX: CPT

## 2025-02-12 RX ORDER — TIMOLOL MALEATE 2.5 MG/ML
1 SOLUTION/ DROPS OPHTHALMIC DAILY
COMMUNITY

## 2025-02-12 RX ORDER — IPRATROPIUM BROMIDE AND ALBUTEROL SULFATE 2.5; .5 MG/3ML; MG/3ML
3 SOLUTION RESPIRATORY (INHALATION) 4 TIMES DAILY PRN
Qty: 90 ML | Refills: 5 | Status: SHIPPED | OUTPATIENT
Start: 2025-02-12

## 2025-02-12 RX ORDER — ALBUTEROL SULFATE 90 UG/1
2 INHALANT RESPIRATORY (INHALATION) EVERY 6 HOURS PRN
Qty: 8.5 G | Refills: 11 | Status: SHIPPED | OUTPATIENT
Start: 2025-02-12

## 2025-02-12 RX ADMIN — POTASSIUM CHLORIDE 30 MEQ: 1500 TABLET, EXTENDED RELEASE ORAL at 08:17

## 2025-02-12 RX ADMIN — GUAIFENESIN 1200 MG: 600 TABLET ORAL at 08:15

## 2025-02-12 RX ADMIN — DOXYCYCLINE 100 MG: 100 CAPSULE ORAL at 08:14

## 2025-02-12 RX ADMIN — PREDNISONE 40 MG: 20 TABLET ORAL at 08:15

## 2025-02-12 RX ADMIN — AMLODIPINE BESYLATE 5 MG: 5 TABLET ORAL at 08:18

## 2025-02-12 RX ADMIN — AZELASTINE HYDROCHLORIDE 1 SPRAY: 137 SPRAY, METERED NASAL at 08:20

## 2025-02-12 RX ADMIN — FLUTICASONE PROPIONATE 1 SPRAY: 50 SPRAY, METERED NASAL at 08:20

## 2025-02-12 RX ADMIN — IPRATROPIUM BROMIDE AND ALBUTEROL SULFATE 3 ML: 2.5; .5 SOLUTION RESPIRATORY (INHALATION) at 07:07

## 2025-02-12 NOTE — PLAN OF CARE
Goal Outcome Evaluation:      No issues over night. SOA with exertion. Remains on room air. VSS. POC on going.

## 2025-02-12 NOTE — DISCHARGE SUMMARY
[facility] Medicine Services  DISCHARGE SUMMARY    Patient Name: Jessika Mustafa  : 1960  MRN: [id number]    Date of Admission: 2/10/2025  3:05 PM  Date of Discharge:  2025  Primary Care Physician: Selin Marte PA    Consults       No orders found from 2025 to 2025.            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          Hospital Course:  Jessika Mustafa is a 64 y.o. female lifelong smoker - recently quit - presenting with dyspnea.        Influenza A  COPD with exacerbation   - She does not have home oxygen or inhalers.  RA sat was 87% on admission   - Bronchospasm improved with nebs  - She is arranged with home neb machine, Duonebs, and albuterol inhaler at DC     Discharge Follow Up Recommendations for outpatient labs/diagnostics:   - FU with PCP per routine.      Day of Discharge     HPI:   breathing is better, still on RA, has walked in room     Review of Systems   No fever     Vital Signs:   Temp:  [97.4 °F (36.3 °C)-98.1 °F (36.7 °C)] 97.7 °F (36.5 °C)  Heart Rate:  [] 79  Resp:  [16-20] 16  BP: (142-176)/(78-92) 148/78  Flow (L/min) (Oxygen Therapy):  [2] 2      Physical Exam:  Gen:  WD/WN  Neuro: alert and oriented, clear speech,   HEENT:  NC/AT   Neck:  Supple, no LAD  Lungs - CTA, sl diminished throughout   Heart RRR no murmur, rub, or gallop  Abd:  Soft, nontender  Extrem:  No c/c/e      Pertinent  and/or Most Recent Results     LAB RESULTS:      Lab 25  0709 02/10/25  1256    WBC 6.76 6.82   HEMOGLOBIN 12.5 14.1   HEMATOCRIT 36.8 42.2   PLATELETS 251 243   NEUTROS ABS  --  4.57   EOS ABS  --  0.00   MCV 88.5 88.7         Lab 02/11/25  0710 02/10/25  1256   SODIUM 144 141   POTASSIUM 3.9 3.6   CHLORIDE 104 100   CO2 30.0* 30.0*   ANION GAP 10.0 11.0   BUN 16 17   CREATININE 0.90 0.91   EGFR 72.4 71.5   GLUCOSE 116* 93   CALCIUM 9.5 10.2         Lab 02/10/25  1256   TOTAL PROTEIN 8.1   ALBUMIN 4.5   GLOBULIN 3.6   ALT (SGPT) 19   AST (SGOT) 22   BILIRUBIN 0.7   ALK PHOS 54         Lab 02/10/25  1527 02/10/25  1256   PROBNP  --  57.2   HSTROP T 9 10                 Brief Urine Lab Results  (Last result in the past 365 days)        Color   Clarity   Blood   Leuk Est   Nitrite   Protein   CREAT   Urine HCG        03/03/24 2320 Yellow   Clear   Trace   Negative   Negative   Negative                 Microbiology Results (last 10 days)       Procedure Component Value - Date/Time    COVID PRE-OP / PRE-PROCEDURE SCREENING ORDER (NO ISOLATION) - Swab, Nasopharynx [[id number]]  (Abnormal) Collected: 02/10/25 1253    Lab Status: Final result Specimen: Swab from Nasopharynx Updated: 02/10/25 1345    Narrative:      The following orders were created for panel order COVID PRE-OP / PRE-PROCEDURE SCREENING ORDER (NO ISOLATION) - Swab, Nasopharynx.  Procedure                               Abnormality         Status                     ---------                               -----------         ------                     COVID-19 and FLU A/B PCR...[[id number]]  Abnormal            Final result                 Please view results for these tests on the individual orders.    COVID-19 and FLU A/B PCR, 1 HR TAT - Swab, Nasopharynx [[id number]]  (Abnormal) Collected: 02/10/25 1253    Lab Status: Final result Specimen: Swab from Nasopharynx Updated: 02/10/25 1345     COVID19 Not Detected     Influenza A PCR Detected     Influenza B PCR Not Detected    Narrative:      Fact sheet for providers:  https://www.fda.gov/media/033982/download    Fact sheet for patients: https://www.fda.gov/media/206470/download    Test performed by PCR.            CT Angiogram Chest    Result Date: 2/10/2025  CT ANGIOGRAM CHEST Date of Exam: 2/10/2025 2:15 PM EST Indication: soa, chest pain. Comparison: None available. Technique: CTA of the chest was performed after the uneventful intravenous administration of 71 mL Isovue-370. Reconstructed coronal and sagittal images were also obtained. In addition, a 3-D volume rendered image was created for interpretation. Automated exposure control and iterative reconstruction methods were used. Findings: There is no worrisome thoracic adenopathy. No acute findings are present in the partially characterized upper abdomen. There is no pleural or pericardial effusion. Mildly atherosclerotic, nonaneurysmal thoracic aorta. The pulmonary arteries are well opacified, without evidence of focal filling defect concerning for acute pulmonary embolus. The osseous structures demonstrate no evidence of acute fracture or aggressive osseous lesion. The lung fields demonstrate mild to moderate emphysema. There is no  evidence of acute infectious or inflammatory process. There is no distinct suspicious focal pulmonary nodularity. Central airways are clear.     Impression: No pulmonary embolus or other acute finding in the chest. Chronic findings are present including moderate emphysema. Electronically Signed: Josh Gordon MD  2/10/2025 2:59 PM EST  Workstation ID: [id number]    XR Chest 1 View    Result Date: 2/10/2025  XR CHEST 1 VW Date of Exam: 2/10/2025 1:06 PM EST Indication: SOA triage protocol Comparison: None available. Findings: Heart size is normal. Mild streaky perihilar opacities and peribronchial cuffing which can be seen with viral infectious process. No discrete consolidation. No pneumothorax. No pleural effusion. The osseous structures are grossly intact.     Impression: Streaky perihilar  opacities and peribronchial cuffing which can be seen with viral infectious process. Electronically Signed: Panfilo eBavers MD  2/10/2025 2:09 PM EST  Workstation ID: [id number]                 Discharge Details        Discharge Medications        New Medications        Instructions Start Date   albuterol sulfate  (90 Base) MCG/ACT inhaler  Commonly known as: PROVENTIL HFA;VENTOLIN HFA;PROAIR HFA   2 puffs, Inhalation, Every 6 Hours PRN      ipratropium-albuterol 0.5-2.5 mg/3 ml nebulizer  Commonly known as: DUO-NEB   Inhale 3 mL (1 vial) by nebulization up to 4 (Four) Times a Day As Needed for Wheezing or shortness of breath.             Continue These Medications        Instructions Start Date   azelastine 0.1 % nasal spray  Commonly known as: ASTELIN   SMARTSIG:Both Nares      cholecalciferol 25 MCG (1000 UT) tablet  Commonly known as: VITAMIN D3   1,000 Units, Daily      citalopram 20 MG tablet  Commonly known as: CeleXA   1 tablet, Daily      fluticasone 50 MCG/ACT nasal spray  Commonly known as: FLONASE       Klor-Con M20 20 MEQ CR tablet  Generic drug: potassium chloride   No dose, route, or frequency recorded.      latanoprost 0.005 % ophthalmic solution  Commonly known as: XALATAN   No dose, route, or frequency recorded.      montelukast 10 MG tablet  Commonly known as: SINGULAIR   1 tablet, Nightly      olmesartan-hydrochlorothiazide 40-12.5 MG per tablet  Commonly known as: BENICAR HCT   1 tablet, Daily      pantoprazole 40 MG EC tablet  Commonly known as: PROTONIX   TAKE 1 TABLET BY MOUTH DAILY BEFORE BREAKFAST. DO NOT CRUSH, CHEW, OR SPLIT      SUMAtriptan 100 MG tablet  Commonly known as: IMITREX   No dose, route, or frequency recorded.      timolol 0.25 % ophthalmic solution  Commonly known as: TIMOPTIC   1 drop, Both Eyes, Daily      Varenicline Tartrate (Starter) 0.5 MG X 11 & 1 MG X 42 tablet therapy pack   TAKE 1 (0.5MG) TABLET BY MOUTH DAILY FOR 3 DAYS, THEN TAKE 1 (0.5MG) TABLET TWICE DAILY  FOR 4 DAYS, THEN TAKE 1 (1MG) TABLET TWICE DAILY THEREAFTER. TAKE WITH MEALS AND A FULL GLASS OF WATER..      Xeljanz XR 11 MG tablet sustained-release 24 hour  Generic drug: Tofacitinib Citrate ER   11 mg, Oral, Daily               Allergies   Allergen Reactions    Lisinopril Cough    Losartan Cough    Metronidazole Rash      rash         Discharge Disposition:  Home or Self Care    Diet:  Hospital:  Diet Order   Procedures    Diet: Cardiac; Healthy Heart (2-3 Na+); Fluid Consistency: Thin (IDDSI 0)          CODE STATUS:    Code Status and Medical Interventions: CPR (Attempt to Resuscitate); Full Support   Ordered at: 02/10/25 1912     Level Of Support Discussed With:    Patient     Code Status (Patient has no pulse and is not breathing):    CPR (Attempt to Resuscitate)     Medical Interventions (Patient has pulse or is breathing):    Full Support       Future Appointments   Date Time Provider Department Center   5/21/2025 11:15 AM Anand Braga APRN [facility]   9/26/2025 11:40 AM [facility] 1 (SCREENING ROOM) [facility]   10/9/2025  1:20 PM Brook Braga MD [facility]  [facility] Chong MD  02/12/25      Time Spent on Discharge:  I spent  35  minutes on this discharge activity which included: face-to-face encounter with the patient, reviewing the data in the system, coordination of the care with the nursing staff as well as consultants, documentation, and entering orders.

## 2025-02-12 NOTE — CASE MANAGEMENT/SOCIAL WORK
Case Management Discharge Note      Final Note: I met with this patient bedside regarding her discharge home today. She is in agreement. Her sister can transport. CM made a referral to [employer] for a nebulizer machine. They will deliver bedside. She denies having any further discharge planning needs.         Selected Continued Care - Admitted Since 2/10/2025       Destination    No services have been selected for the patient.                Durable Medical Equipment Coordination complete.      Service Provider Services Address Phone Fax Patient Preferred    [facility] [address], [place] [id number] [phone] --              Dialysis/Infusion    No services have been selected for the patient.                Home Medical Care    No services have been selected for the patient.                Therapy    No services have been selected for the patient.                Community Resources    No services have been selected for the patient.                Community & DME    No services have been selected for the patient.                    Selected Continued Care - Episodes Includes continued care and service providers with selected services from the active episodes listed below      Rheumatology - External Fill Episode start date: 9/9/2024   There are no active outsourced providers for this episode.                      Final Discharge Disposition Code: 01 - home or self-care

## 2025-02-13 ENCOUNTER — READMISSION MANAGEMENT (OUTPATIENT)
Dept: CALL CENTER | Facility: HOSPITAL | Age: 65
End: 2025-02-13
Payer: COMMERCIAL

## 2025-02-13 NOTE — OUTREACH NOTE
Prep Survey      Flowsheet Row Responses   [facility] facility patient discharged from? [facility]   Is LACE score < 7 ? No   Eligibility Readm Mgmt   Discharge diagnosis Influenza A   Does the patient have one of the following disease processes/diagnoses(primary or secondary)? Other   Does the patient have Home health ordered? No   Is there a DME ordered? Yes   What DME was ordered? [employer]--nebulizere   Medication alerts for this patient see avs   Prep survey completed? Yes            Altagracia PEREZ - Registered Nurse

## 2025-02-19 ENCOUNTER — READMISSION MANAGEMENT (OUTPATIENT)
Dept: CALL CENTER | Facility: HOSPITAL | Age: 65
End: 2025-02-19
Payer: COMMERCIAL

## 2025-02-19 NOTE — OUTREACH NOTE
Medical Week 1 Survey      Flowsheet Row Responses   [facility] patient discharged from? [place]   Does the patient have one of the following disease processes/diagnoses(primary or secondary)? Other   Week 1 attempt successful? Yes   Call start time 1402   Call end time 1413   Discharge diagnosis Influenza A   Meds reviewed with patient/caregiver? Yes   Is the patient having any side effects they believe may be caused by any medication additions or changes? No   Does the patient have all medications ordered at discharge? Yes   Is the patient taking all medications as directed (includes completed medication regime)? Yes   Medication comments Pt reports that her Pulmonologist called in an Antibiotic, Anoro inhaler and steroid in for pt.   Does the patient have a primary care provider?  Yes   Does the patient have an appointment with their PCP within 7 days of discharge? Yes   Has the patient kept scheduled appointments due by today? Yes   What DME was ordered? [employer]--nebulizere   Has all DME been delivered? Yes   Comments Patient reports ongoing SOA and fatigue. Patient denies N/V but reports decreased appetite. Patient encouraged to stay hydrated and eat smaller portions, to encourage intake. Pt educated on importance of using Is at home, pt v/u. Pt denies cough, fever or chills at this time.   Did the patient receive a copy of their discharge instructions? Yes   Nursing interventions Reviewed instructions with patient   What is the patient's perception of their health status since discharge? Improving   Is the patient/caregiver able to teach back signs and symptoms related to disease process for when to call PCP? Yes   Is the patient/caregiver able to teach back signs and symptoms related to disease process for when to call 911? Yes   Is the patient/caregiver able to teach back the hierarchy of who to call/visit for symptoms/problems? PCP, Specialist, Home health nurse, Urgent Care, ED, 911 Yes   If the  patient is a current smoker, are they able to teach back resources for cessation? Not a smoker  [Pt recently stopped smoking, she reports]   Week 1 call completed? Yes   Call end time 1413            Amada BARRETO - Registered Nurse

## 2025-03-03 ENCOUNTER — READMISSION MANAGEMENT (OUTPATIENT)
Dept: CALL CENTER | Facility: HOSPITAL | Age: 65
End: 2025-03-03
Payer: COMMERCIAL

## 2025-03-03 NOTE — OUTREACH NOTE
Medical Week 2 Survey      Flowsheet Row Responses   [facility] patient discharged from? [place]   Does the patient have one of the following disease processes/diagnoses(primary or secondary)? Other   Week 2 attempt successful? No   Unsuccessful attempts Attempt 1   Revoke Other transitional program            Nuzhat Smart Registered Nurse

## 2025-03-28 NOTE — ASSESSMENT & PLAN NOTE
Check lipids every 6 months as Xeljanz has been shown to increase cholesterol.  
Difficulty falling asleep.  Melatonin was no help.  Historically was on amitriptyline but she also stopped this.  
On 5/26/2016 her folate was normal, B12 normal, CBC normal, uric acid 3.4, ASO normal, CRP 10.5 with less than 4.9 being normal, rheumatoid factor 18.7 with less than 13.9 being normal, CMP normal UA normal TSH low at 0.419 with a 0.450-4.5 being normal.    1.  She avoids NSAIDs due to concern about kidney disease.  2 she stopped Humira on 5/27/2020 due to recurrent yeast infections.  3.  She stopped methotrexate 6/20/2021 as it had no effect on her symptoms.  4.  Her prognosis seems good.  5.  Follow-up in 3 to 4 months.  6.  Continue Xeljanz XR which is helpful.  CV risk warning has been previously discussed.  7.  Primate is helpful.  10.  Orencia from 6/20/2020 to 1/20/2021 and was stopped due to poor response.  
She had her hepatitis panel and QTB last week.  
Treated for esophageal use for 21 days per GI in March 2020.  No recurrence but she has not had throat problems ever since.  In the fall 2023 she had endoscopy that show chronic cough is related to mucus.  
Xeljanz XR started January 2021.  Failed Humira, Orencia, methotrexate.    1. hold if the patient develops infection  2.  Avoid live vaccines on this medication.  3.  No recent serious infections.  4.  No injection site reactions.  5.  Also hold this medication perioperatively if the patient is going up surgical procedure.  
[Time Spent: ___ minutes] : I have spent [unfilled] minutes of time on the encounter which excludes teaching and separately reported services.
[Time Spent: ___ minutes] : I have spent [unfilled] minutes of time on the encounter which excludes teaching and separately reported services.

## 2025-04-21 ENCOUNTER — TELEPHONE (OUTPATIENT)
Age: 65
End: 2025-04-21
Payer: COMMERCIAL

## 2025-04-21 ENCOUNTER — SPECIALTY PHARMACY (OUTPATIENT)
Age: 65
End: 2025-04-21
Payer: COMMERCIAL

## 2025-04-21 NOTE — TELEPHONE ENCOUNTER
Prior authorization initiated by [facility].  Update will be provided when a determination has been received.     Medication: Xeljanz XR  PA Submission Method: CMM  Case Number/CMM Key: [id number]

## 2025-04-21 NOTE — TELEPHONE ENCOUNTER
PA request has been approved and pharmacy notified (Filling pharmacy will be notified by phone, fax, or submitted prescription)    Authorized Medication: Xeljanz XR  Name of Insurance Approving PA: [facility]  PA Effective Dates: 4.21.25-4.21.26  Dispensing Pharm[facility]

## 2025-05-14 ENCOUNTER — LAB (OUTPATIENT)
Facility: HOSPITAL | Age: 65
End: 2025-05-14
Payer: COMMERCIAL

## 2025-05-14 DIAGNOSIS — Z79.899 HIGH RISK MEDICATION USE: ICD-10-CM

## 2025-05-14 DIAGNOSIS — M15.0 PRIMARY OSTEOARTHRITIS INVOLVING MULTIPLE JOINTS: ICD-10-CM

## 2025-05-14 DIAGNOSIS — R53.83 FATIGUE, UNSPECIFIED TYPE: ICD-10-CM

## 2025-05-14 DIAGNOSIS — M05.9 SEROPOSITIVE RHEUMATOID ARTHRITIS: ICD-10-CM

## 2025-05-14 LAB
ALBUMIN SERPL-MCNC: 4.1 G/DL (ref 3.5–5.2)
ALBUMIN/GLOB SERPL: 1.2 G/DL
ALP SERPL-CCNC: 69 U/L (ref 39–117)
ALT SERPL W P-5'-P-CCNC: 9 U/L (ref 1–33)
ANION GAP SERPL CALCULATED.3IONS-SCNC: 11.4 MMOL/L (ref 5–15)
AST SERPL-CCNC: 15 U/L (ref 1–32)
BASOPHILS # BLD AUTO: 0.02 10*3/MM3 (ref 0–0.2)
BASOPHILS NFR BLD AUTO: 0.5 % (ref 0–1.5)
BILIRUB SERPL-MCNC: 0.5 MG/DL (ref 0–1.2)
BUN SERPL-MCNC: 8 MG/DL (ref 8–23)
BUN/CREAT SERPL: 7.8 (ref 7–25)
CALCIUM SPEC-SCNC: 9.8 MG/DL (ref 8.6–10.5)
CHLORIDE SERPL-SCNC: 104 MMOL/L (ref 98–107)
CO2 SERPL-SCNC: 24.6 MMOL/L (ref 22–29)
CREAT SERPL-MCNC: 1.02 MG/DL (ref 0.57–1)
CRP SERPL-MCNC: 1.92 MG/DL (ref 0–0.5)
DEPRECATED RDW RBC AUTO: 45.1 FL (ref 37–54)
EGFRCR SERPLBLD CKD-EPI 2021: 62.3 ML/MIN/1.73
EOSINOPHIL # BLD AUTO: 0.41 10*3/MM3 (ref 0–0.4)
EOSINOPHIL NFR BLD AUTO: 9.8 % (ref 0.3–6.2)
ERYTHROCYTE [DISTWIDTH] IN BLOOD BY AUTOMATED COUNT: 13.6 % (ref 12.3–15.4)
ERYTHROCYTE [SEDIMENTATION RATE] IN BLOOD: 29 MM/HR (ref 0–30)
GLOBULIN UR ELPH-MCNC: 3.3 GM/DL
GLUCOSE SERPL-MCNC: 94 MG/DL (ref 65–99)
HAV IGM SERPL QL IA: NORMAL
HBV CORE IGM SERPL QL IA: NORMAL
HBV SURFACE AG SERPL QL IA: NORMAL
HCT VFR BLD AUTO: 35.8 % (ref 34–46.6)
HCV AB SER QL: NORMAL
HGB BLD-MCNC: 11.5 G/DL (ref 12–15.9)
IMM GRANULOCYTES # BLD AUTO: 0.01 10*3/MM3 (ref 0–0.05)
IMM GRANULOCYTES NFR BLD AUTO: 0.2 % (ref 0–0.5)
LYMPHOCYTES # BLD AUTO: 1.32 10*3/MM3 (ref 0.7–3.1)
LYMPHOCYTES NFR BLD AUTO: 31.7 % (ref 19.6–45.3)
MCH RBC QN AUTO: 29.4 PG (ref 26.6–33)
MCHC RBC AUTO-ENTMCNC: 32.1 G/DL (ref 31.5–35.7)
MCV RBC AUTO: 91.6 FL (ref 79–97)
MONOCYTES # BLD AUTO: 0.48 10*3/MM3 (ref 0.1–0.9)
MONOCYTES NFR BLD AUTO: 11.5 % (ref 5–12)
NEUTROPHILS NFR BLD AUTO: 1.93 10*3/MM3 (ref 1.7–7)
NEUTROPHILS NFR BLD AUTO: 46.3 % (ref 42.7–76)
NRBC BLD AUTO-RTO: 0 /100 WBC (ref 0–0.2)
PLATELET # BLD AUTO: 286 10*3/MM3 (ref 140–450)
PMV BLD AUTO: 10.8 FL (ref 6–12)
POTASSIUM SERPL-SCNC: 4 MMOL/L (ref 3.5–5.2)
PROT SERPL-MCNC: 7.4 G/DL (ref 6–8.5)
RBC # BLD AUTO: 3.91 10*6/MM3 (ref 3.77–5.28)
SODIUM SERPL-SCNC: 140 MMOL/L (ref 136–145)
WBC NRBC COR # BLD AUTO: 4.17 10*3/MM3 (ref 3.4–10.8)

## 2025-05-14 PROCEDURE — 86140 C-REACTIVE PROTEIN: CPT

## 2025-05-14 PROCEDURE — 86480 TB TEST CELL IMMUN MEASURE: CPT

## 2025-05-14 PROCEDURE — 36415 COLL VENOUS BLD VENIPUNCTURE: CPT

## 2025-05-14 PROCEDURE — 80074 ACUTE HEPATITIS PANEL: CPT

## 2025-05-14 PROCEDURE — 85652 RBC SED RATE AUTOMATED: CPT

## 2025-05-14 PROCEDURE — 85025 COMPLETE CBC W/AUTO DIFF WBC: CPT

## 2025-05-14 PROCEDURE — 80053 COMPREHEN METABOLIC PANEL: CPT

## 2025-05-15 ENCOUNTER — RESULTS FOLLOW-UP (OUTPATIENT)
Age: 65
End: 2025-05-15
Payer: COMMERCIAL

## 2025-05-15 NOTE — ASSESSMENT & PLAN NOTE
* At presentation to ACL on 6/29/2016: Joint pain/weakness for 3 months. Glucosamine/chondroitin not helping. She avoids NSAIDS due to concern about kidney disease.  * 5/26/2016: Folate normal, B12 normal, CBC normal, uric acid 3.4, ASO normal, CRP 10.5 (< 4.9), RF 18.7 (< 13.9), CMP normal, UA normal, TSH low 0.419 (0.450-4.5)  1. She avoids NSAIDS due to concern about kidney disease.  2. Stopped Humira 5/27/2020 due to recurrent yeast infections  3. She stopped MTX 6/2021 with no change in joint s/s.   4.  Prognosis seems good.   5. Follow up in 3-4 months.   6. Refill medication.   7. Check labs prior to next visit. Reviewed labs from 5/2025.  CRP mildly elevated.  She has been congested. Creatinine 1.02.  8.  Continue Xeljanz XR which is helpful. Refill today   9. She has taken/tried Rheumate   10. Orencia started 6/2020. Stopped 1/2021 due to poor response.   11. She has been diagnosed with COPD    Orders:    Comprehensive Metabolic Panel; Future    C-reactive Protein; Future    Sedimentation Rate; Future    CBC & Differential; Future

## 2025-05-15 NOTE — PROGRESS NOTES
Office Visit       Date: 05/21/2025   Patient Name: Jessika Mustafa  MRN: [id number]  YOB: 1960    Referring Physician: No ref. provider found     Chief Complaint:   Chief Complaint   Patient presents with    Rheumatoid Arthritis       History of Present Illness: Jessika Mustafa is a 64 y.o. female who is here today for follow-up of rheumatoid arthritis.  Patient reports feeling the same.  She rates her pain 0 out of 10 and has no morning stiffness.  She has been started on pantoprazole and montelukast as well as azelastine since we last saw her.  She has been diagnosed with emphysema.  Breathing test tomorrow.  She will see pulmonology in one week.    Interim 5/21/25: Today she rates her pain 1/10, global 2/10 and no morning stiffness.  She declines refills.  She is interested in weight loss injections and will speak to her PCP about this.  She has gained 20 pounds since her last visit.  She has quit smoking as well.  She had the flu in interim.      Subjective   Review of Systems:  Positive for postnasal drip, cough, shortness of breath and back pain.  All other review of symptoms are negative.    Past Medical History:   Past Medical History:   Diagnosis Date    Anemia DK    Arthritis, rheumatoid     Chronic obstructive pulmonary disease with acute exacerbation 2/10/2025    High risk medication use 09/19/2024    History of abnormal cervical Papanicolaou smear     HPV + non 16/18    HPV (human papilloma virus) infection DK    Hyperlipidemia 2022    Hypertension     Migraine headache     Osteopenia     Rheumatoid arthritis        Past Surgical History:   Past Surgical History:   Procedure Laterality Date    COLONOSCOPY  05/01/2017    benign polyps; repeat in 5 years    COLPOSCOPY W/ BIOPSY / CURETTAGE      10/15/2020, 9/19/2019, 09/06/2018    KNEE CARTILAGE SURGERY      KNEE SURGERY Bilateral     TOE TENDON REPAIR      TUBAL ABDOMINAL LIGATION  2006    WISDOM TOOTH EXTRACTION          Family History:   Family History   Problem Relation Age of Onset    Arthritis Mother     Diabetes Mother     Heart disease Mother     Hypertension Mother     Stroke Mother     Alzheimer's disease Mother     Arthritis Father     Hypertension Father     Kidney disease Father     Lung disease Father         BLACK LUNG    Asthma Sister     Diabetes Sister     Hypertension Sister     Diabetes Brother     Hypertension Brother     Kidney disease Brother     Gout Brother     Diabetes Maternal Grandmother     Diabetes Paternal Grandmother     Breast cancer Paternal Cousin     Prostate cancer Brother     Ovarian cancer Neg Hx     Uterine cancer Neg Hx     Colon cancer Neg Hx        Social History:   Social History     Socioeconomic History    Marital status: Single   Tobacco Use    Smoking status: Every Day     Average packs/day: 0.5 packs/day for 30.2 years (15.0 ttl pk-yrs)     Types: Cigarettes     Start date: 12/1/2024     Passive exposure: Past    Smokeless tobacco: Never    Tobacco comments:     30+ years, as of 09/09/2021   Vaping Use    Vaping status: Never Used   Substance and Sexual Activity    Alcohol use: Never    Drug use: Never    Sexual activity: Yes     Partners: Male     Birth control/protection: Tubal ligation       Medications:   Current Outpatient Medications:     albuterol sulfate  (90 Base) MCG/ACT inhaler, Inhale 2 puffs Every 6 (Six) Hours As Needed for Wheezing., Disp: 8.5 g, Rfl: 11    Anoro Ellipta 62.5-25 MCG/ACT aerosol powder  inhaler, Inhale 1 puff Daily., Disp: , Rfl:     azelastine (ASTELIN) 0.1 % nasal spray, SMARTSIG:Both Nares, Disp: , Rfl:     Cholecalciferol 25 MCG (1000 UT) tablet, Take 1 tablet by mouth Daily. Patient takes 2, Disp: , Rfl:     citalopram (CeleXA) 20 MG tablet, Take 1 tablet by mouth Daily., Disp: , Rfl:     fluticasone (FLONASE) 50 MCG/ACT nasal spray, , Disp: , Rfl:     ipratropium-albuterol (DUO-NEB) 0.5-2.5 mg/3 ml nebulizer, Inhale 3 mL (1 vial) by  "nebulization up to 4 (Four) Times a Day As Needed for Wheezing or shortness of breath., Disp: 90 mL, Rfl: 5    KLOR-CON 20 MEQ CR tablet, , Disp: , Rfl:     latanoprost (XALATAN) 0.005 % ophthalmic solution, , Disp: , Rfl:     montelukast (SINGULAIR) 10 MG tablet, Take 1 tablet by mouth Every Night., Disp: , Rfl:     olmesartan-hydrochlorothiazide (BENICAR HCT) 40-12.5 MG per tablet, Take 1 tablet by mouth Daily., Disp: , Rfl:     pantoprazole (PROTONIX) 40 MG EC tablet, TAKE 1 TABLET BY MOUTH DAILY BEFORE BREAKFAST. DO NOT CRUSH, CHEW, OR SPLIT, Disp: , Rfl:     SUMAtriptan (IMITREX) 100 MG tablet, , Disp: , Rfl:     timolol (TIMOPTIC) 0.25 % ophthalmic solution, Administer 1 drop to both eyes Daily., Disp: , Rfl:     Xeljanz XR 11 MG tablet sustained-release 24 hour, Take 1 tablet by mouth Daily., Disp: 90 tablet, Rfl: 1    fluconazole (DIFLUCAN) 150 MG tablet, Take 1 tablet by mouth. (Patient not taking: Reported on 5/21/2025), Disp: , Rfl:     Varenicline Tartrate, Starter, 0.5 MG X 11 & 1 MG X 42 tablet therapy pack, TAKE 1 (0.5MG) TABLET BY MOUTH DAILY FOR 3 DAYS, THEN TAKE 1 (0.5MG) TABLET TWICE DAILY FOR 4 DAYS, THEN TAKE 1 (1MG) TABLET TWICE DAILY THEREAFTER. TAKE WITH MEALS AND A FULL GLASS OF WATER.. (Patient not taking: Reported on 5/21/2025), Disp: , Rfl:     Allergies:   Allergies   Allergen Reactions    Lisinopril Cough    Losartan Cough    Metronidazole Rash      rash       I have reviewed and updated the patient's chief complaint, history of present illness, review of systems, past medical history, surgical history, family history, social history, medications and allergy list as appropriate.     Objective    Vital Signs:   Vitals:    05/21/25 1120   BP: 138/78   BP Location: Left arm   Patient Position: Sitting   Cuff Size: Adult   Pulse: 57   Temp: 97 °F (36.1 °C)   Weight: 86.2 kg (190 lb)   Height: 165.1 cm (65\")   PainSc: 1    PainLoc: Generalized       Body mass index is 31.62 kg/m².   Defer to " PCP       Physical Exam:  Physical Exam  Vitals reviewed.   Constitutional:       Appearance: Normal appearance.   HENT:      Head: Normocephalic and atraumatic.      Mouth/Throat:      Mouth: Mucous membranes are moist.   Eyes:      Conjunctiva/sclera: Conjunctivae normal.   Cardiovascular:      Rate and Rhythm: Normal rate and regular rhythm.      Pulses: Normal pulses.      Heart sounds: Normal heart sounds.   Pulmonary:      Effort: Pulmonary effort is normal.      Breath sounds: Normal breath sounds.   Musculoskeletal:         General: Normal range of motion.      Cervical back: Normal range of motion and neck supple.      Comments: No synovitis  [place] bilaterally  Left knee crepitus  No tender or swollen joints   Skin:     General: Skin is warm and dry.   Neurological:      General: No focal deficit present.      Mental Status: She is alert and oriented to person, place, and time. Mental status is at baseline.   Psychiatric:         Mood and Affect: Mood normal.         Behavior: Behavior normal.         Thought Content: Thought content normal.         Judgment: Judgment normal.          Results Review:   Imaging Results (Last 24 Hours)       ** No results found for the last 24 hours. **            Procedures    Assessment / Plan      Assessment & Plan  Seropositive rheumatoid arthritis  * At presentation to [facility] on 6/29/2016: Joint pain/weakness for 3 months. Glucosamine/chondroitin not helping. She avoids NSAIDS due to concern about kidney disease.  * 5/26/2016: Folate normal, B12 normal, CBC normal, uric acid 3.4, ASO normal, CRP 10.5 (< 4.9), RF 18.7 (< 13.9), CMP normal, UA normal, TSH low 0.419 (0.450-4.5)  1. She avoids NSAIDS due to concern about kidney disease.  2. Stopped Humira 5/27/2020 due to recurrent yeast infections  3. She stopped MTX 6/2021 with no change in joint s/s.   4.  Prognosis seems good.   5. Follow up in 3-4 months.   6. Refill medication.   7. Check labs prior to next visit.  Reviewed labs from 5/2025.  CRP mildly elevated.  She has been congested. Creatinine 1.02.  8.  Continue Xeljanz XR which is helpful. Refill today   9. She has taken/tried Rheumate   10. Orencia started 6/2020. Stopped 1/2021 due to poor response.   11. She has been diagnosed with COPD    Orders:    Comprehensive Metabolic Panel; Future    C-reactive Protein; Future    Sedimentation Rate; Future    CBC & Differential; Future     Primary osteoarthritis involving multiple joints  Tylenol PRN is ok as directed  She avoids oral NSAIDS due to concerns about kidney disease  She has tried taking supplements like glucosamine   She has seen a chiropractor     Need for lipid screening  Check lipids every 6 months as Xeljanz has been shown to increase cholesterol.        Immunodeficiency due to drug therapy  Xeljanz XR started January 2021.  Failed Humira, Orencia, methotrexate.  QTB and hepatitis negative 5/2025    1. hold if the patient develops infection  2.  Avoid live vaccines on this medication.  3.  No recent serious infections.  4.  No injection site reactions.  5.  Also hold this medication perioperatively if the patient is going up surgical procedure.  Orders:    Comprehensive Metabolic Panel; Future    C-reactive Protein; Future    Sedimentation Rate; Future    CBC & Differential; Future     High risk medication use  Xeljanz XR 11 mg PO once/day for RA   1. Hold if the patient develops infection.   2. Avoid live vaccines while on this medication.   3. No recent serious infections  4. Also hold this medication perioperatively if the patient is going to have a surgical procedure                Follow Up:   Return in about 6 months (around 11/21/2025) for Dr. Theodore.        ANNALEE Morris   [facility]

## 2025-05-15 NOTE — ASSESSMENT & PLAN NOTE
Treated for esophageal use for 21 days per GI in March 2020.  No recurrence but she has not had throat problems ever since.  In the fall 2023 she had endoscopy that show chronic cough is related to mucus.

## 2025-05-17 LAB
GAMMA INTERFERON BACKGROUND BLD IA-ACNC: 0.02 IU/ML
M TB IFN-G BLD-IMP: NEGATIVE
M TB IFN-G CD4+ BCKGRND COR BLD-ACNC: 0.02 IU/ML
M TB IFN-G CD4+CD8+ BCKGRND COR BLD-ACNC: 0.02 IU/ML
MITOGEN IGNF BCKGRD COR BLD-ACNC: >10 IU/ML
QUANTIFERON INCUBATION: NORMAL
SERVICE CMNT-IMP: NORMAL

## 2025-05-21 ENCOUNTER — OFFICE VISIT (OUTPATIENT)
Age: 65
End: 2025-05-21
Payer: COMMERCIAL

## 2025-05-21 VITALS
BODY MASS INDEX: 31.65 KG/M2 | TEMPERATURE: 97 F | WEIGHT: 190 LBS | HEART RATE: 57 BPM | SYSTOLIC BLOOD PRESSURE: 138 MMHG | HEIGHT: 65 IN | DIASTOLIC BLOOD PRESSURE: 78 MMHG

## 2025-05-21 DIAGNOSIS — D84.821 IMMUNODEFICIENCY DUE TO DRUG THERAPY: ICD-10-CM

## 2025-05-21 DIAGNOSIS — Z79.899 HIGH RISK MEDICATION USE: ICD-10-CM

## 2025-05-21 DIAGNOSIS — Z13.220 NEED FOR LIPID SCREENING: ICD-10-CM

## 2025-05-21 DIAGNOSIS — M15.0 PRIMARY OSTEOARTHRITIS INVOLVING MULTIPLE JOINTS: ICD-10-CM

## 2025-05-21 DIAGNOSIS — Z79.899 IMMUNODEFICIENCY DUE TO DRUG THERAPY: ICD-10-CM

## 2025-05-21 DIAGNOSIS — M05.9 SEROPOSITIVE RHEUMATOID ARTHRITIS: Primary | ICD-10-CM

## 2025-05-21 PROCEDURE — 99214 OFFICE O/P EST MOD 30 MIN: CPT | Performed by: NURSE PRACTITIONER

## 2025-05-21 RX ORDER — FLUCONAZOLE 150 MG/1
150 TABLET ORAL
COMMUNITY
Start: 2025-02-06

## 2025-05-21 RX ORDER — UMECLIDINIUM BROMIDE AND VILANTEROL TRIFENATATE 62.5; 25 UG/1; UG/1
1 POWDER RESPIRATORY (INHALATION) DAILY
COMMUNITY
Start: 2025-04-29

## 2025-06-02 ENCOUNTER — SPECIALTY PHARMACY (OUTPATIENT)
Age: 65
End: 2025-06-02
Payer: COMMERCIAL

## 2025-07-06 ENCOUNTER — HOSPITAL ENCOUNTER (EMERGENCY)
Facility: HOSPITAL | Age: 65
End: 2025-07-06
Attending: EMERGENCY MEDICINE | Admitting: EMERGENCY MEDICINE
Payer: COMMERCIAL

## 2025-07-06 VITALS
HEIGHT: 66 IN | WEIGHT: 170 LBS | BODY MASS INDEX: 27.32 KG/M2 | DIASTOLIC BLOOD PRESSURE: 59 MMHG | SYSTOLIC BLOOD PRESSURE: 204 MMHG | RESPIRATION RATE: 14 BRPM

## 2025-07-06 DIAGNOSIS — Z87.09 HISTORY OF COPD: ICD-10-CM

## 2025-07-06 DIAGNOSIS — I46.9 CARDIAC ARREST: Primary | ICD-10-CM

## 2025-07-06 DIAGNOSIS — R09.2 RESPIRATORY ARREST: ICD-10-CM

## 2025-07-06 PROCEDURE — 92950 HEART/LUNG RESUSCITATION CPR: CPT

## 2025-07-06 PROCEDURE — 99285 EMERGENCY DEPT VISIT HI MDM: CPT

## 2025-07-06 PROCEDURE — 25010000002 CALCIUM CHLORIDE 10 % SOLUTION: Performed by: EMERGENCY MEDICINE

## 2025-07-06 RX ORDER — CALCIUM CHLORIDE 100 MG/ML
INJECTION INTRAVENOUS; INTRAVENTRICULAR
Status: COMPLETED | OUTPATIENT
Start: 2025-07-06 | End: 2025-07-06

## 2025-07-06 RX ORDER — INDOMETHACIN 25 MG/1
CAPSULE ORAL
Status: COMPLETED | OUTPATIENT
Start: 2025-07-06 | End: 2025-07-06

## 2025-07-06 RX ADMIN — CALCIUM CHLORIDE 1 G: 100 INJECTION INTRAVENOUS; INTRAVENTRICULAR at 01:38

## 2025-07-06 RX ADMIN — SODIUM BICARBONATE 50 MEQ: 84 INJECTION INTRAVENOUS at 01:36

## 2025-07-06 NOTE — ED PROVIDER NOTES
Subjective   History of Present Illness  65-year-old female with history of hypertension, moderate COPD, rheumatoid arthritis, and osteoarthritis presents to the emergency department brought by EMS following a witnessed cardiac arrest.  EMS reports they were initially called to the patient's residence for evaluation of difficulty breathing, which reportedly had started approximately 1 hour prior to arrival.  EMS arrived to find the patient unresponsive with agonal respirations and CPR was initiated at 0053.  Initial rhythm for EMS was asystole.  The patient received epinephrine IV x 1 dose, followed by an epinephrine drip.  The patient had return of circulation and rhythm changed to sinus bradycardia.  EMS was unable to obtain a blood pressure prior to arrival.  The patient was given supplemental oxygen by nonrebreather mask, then ventilations were assisted with bag-valve-mask, and the patient was then intubated by EMS prior to arrival.  EMS then noted the patient had no pulse again when they arrived to this hospital.  CPR with Anshul device was restarted in the ambulance bay at our facility.  EMS placed a right tibia intraosseous line prior to arrival.  EMS reports blood glucose was 121 prior to arrival.       Review of Systems   Unable to perform ROS: Intubated       Past Medical History:   Diagnosis Date    Anemia DK    Arthritis, rheumatoid     Chronic obstructive pulmonary disease with acute exacerbation 2/10/2025    High risk medication use 09/19/2024    History of abnormal cervical Papanicolaou smear     HPV + non 16/18    HPV (human papilloma virus) infection DK    Hyperlipidemia 2022    Hypertension     Migraine headache     Osteopenia     Rheumatoid arthritis        Allergies   Allergen Reactions    Lisinopril Cough    Losartan Cough    Metronidazole Rash      rash       Past Surgical History:   Procedure Laterality Date    COLONOSCOPY  05/01/2017    benign polyps; repeat in 5 years    COLPOSCOPY W/ BIOPSY  / CURETTAGE      10/15/2020, 9/19/2019, 09/06/2018    KNEE CARTILAGE SURGERY      KNEE SURGERY Bilateral     TOE TENDON REPAIR      TUBAL ABDOMINAL LIGATION  2006    WISDOM TOOTH EXTRACTION         Family History   Problem Relation Age of Onset    Arthritis Mother     Diabetes Mother     Heart disease Mother     Hypertension Mother     Stroke Mother     Alzheimer's disease Mother     Arthritis Father     Hypertension Father     Kidney disease Father     Lung disease Father         BLACK LUNG    Asthma Sister     Diabetes Sister     Hypertension Sister     Diabetes Brother     Hypertension Brother     Kidney disease Brother     Gout Brother     Diabetes Maternal Grandmother     Diabetes Paternal Grandmother     Breast cancer Paternal Cousin     Prostate cancer Brother     Ovarian cancer Neg Hx     Uterine cancer Neg Hx     Colon cancer Neg Hx        Social History     Socioeconomic History    Marital status: Single   Tobacco Use    Smoking status: Every Day     Average packs/day: 0.5 packs/day for 30.2 years (15.0 ttl pk-yrs)     Types: Cigarettes     Start date: 12/1/2024     Passive exposure: Past    Smokeless tobacco: Never    Tobacco comments:     30+ years, as of 09/09/2021   Vaping Use    Vaping status: Never Used   Substance and Sexual Activity    Alcohol use: Never    Drug use: Never    Sexual activity: Yes     Partners: Male     Birth control/protection: Tubal ligation           Objective   Physical Exam  Vitals and nursing note reviewed.   Constitutional:       Comments: Unresponsive   Eyes:      Comments: Pupils are nonreactive bilaterally, midposition, equal.   Pulmonary:      Comments: No spontaneous respirations.  Intubated.  Decreased breath sounds on the left compared to the right.  Abdominal:      General: There is distension.      Palpations: Abdomen is soft.   Genitourinary:     Comments: Incontinent of urine  Musculoskeletal:         General: No swelling or deformity.   Neurological:       Comments: No response to noxious stimulus.         Procedures           ED Course  ED Course as of 07/10/25 1840   Sun 2025   0156 Despite best efforts, and CPR by ACLS protocol, multiple medications were administered, but multiple pulse checks throughout her ED course showed no pulse and no spontaneous respirations.  Rhythm checks were asystole and PEA.  Bedside ultrasound done by myself shows no large pericardial effusion, and no cardiac activity.  ET tube was pulled back approximately 3 cm by respiratory therapist due to decreased breath sounds on the left and oxygen saturation in the mid to upper 80s.  Following this intervention, the patient's oxygen saturation improved to 100%.  Auscultation of the lungs was performed again, and decreased breath sounds on the left compared to the right were still appreciated.  16-gauge Angiocath was placed to the left second intercostal space with no rush of air appreciated.  This was placed for possible pneumothorax, although this was not confirmed due to high acuity.  Despite our best efforts, in the setting of cardiac standstill and repeated pulse checks with asystole and PEA despite epinephrine drip and multiple other interventions, it was felt that the neurologic outcome would be devastating if circulation was restored.  I discussed the grim prognosis with her sister and brother in the emergency department.  Patient was pronounced  at 0149. [LD]      ED Course User Index  [LD] Henrietta Salinas MD                                                       Medical Decision Making  Differential diagnosis includes COPD exacerbation, pneumothorax, pleural effusion, malignancy, pneumonia, pulmonary embolus, myocardial infarction, heart failure, and others.     Problems Addressed:  Cardiac arrest: complicated acute illness or injury    Amount and/or Complexity of Data Reviewed  Independent Historian: EMS  External Data Reviewed: notes.     Details: 25 office  "note reviewed for primary care provider visit, to get an idea about her past medical history. Medication list reviewed. She is a former cigarette smoker but records indicate she had told her PCP she had quit smoking about five months ago. She had pulmonary function tests indicating moderate COPD.     Risk  Prescription drug management.    No results found for this or any previous visit (from the past 24 hours).  Note: In addition to lab results from this visit, the labs listed above may include labs taken at another facility or during a different encounter within the last 24 hours. Please correlate lab times with ED admission and discharge times for further clarification of the services performed during this visit.    No orders to display     Vitals:    25 0138 25 0144   BP:  (!) 204/59   Resp:  14   Weight: 77.1 kg (170 lb)    Height: 167.6 cm (66\")      Medications - No data to display  ECG/EMG Results (last 24 hours)       ** No results found for the last 24 hours. **          No orders to display           Final diagnoses:   Cardiac arrest   Respiratory arreset  History of COPD    ED Disposition  ED Disposition       ED Disposition       Condition   --    Comment   --               No follow-up provider specified.       Medication List      No changes were made to your prescriptions during this visit.            Henrietta Salinas MD  07/10/25 4794    "

## 2025-07-07 RX ORDER — TOFACITINIB 11 MG/1
1 TABLET, FILM COATED, EXTENDED RELEASE ORAL DAILY
Qty: 90 TABLET | Refills: 1 | OUTPATIENT
Start: 2025-07-07